# Patient Record
Sex: FEMALE | Race: WHITE | NOT HISPANIC OR LATINO | Employment: OTHER | URBAN - NONMETROPOLITAN AREA
[De-identification: names, ages, dates, MRNs, and addresses within clinical notes are randomized per-mention and may not be internally consistent; named-entity substitution may affect disease eponyms.]

---

## 2023-06-05 PROBLEM — E55.9 VITAMIN D DEFICIENCY: Status: ACTIVE | Noted: 2023-06-05

## 2023-06-05 PROBLEM — F41.9 ANXIETY: Status: ACTIVE | Noted: 2023-06-05

## 2023-06-05 PROBLEM — I50.32 CHRONIC DIASTOLIC CONGESTIVE HEART FAILURE (MULTI): Status: ACTIVE | Noted: 2023-06-05

## 2023-06-05 PROBLEM — I10 BENIGN ESSENTIAL HYPERTENSION: Status: ACTIVE | Noted: 2023-06-05

## 2023-06-05 PROBLEM — K76.0 HEPATIC STEATOSIS: Status: ACTIVE | Noted: 2023-06-05

## 2023-06-05 PROBLEM — E06.3 HASHIMOTO'S THYROIDITIS: Status: ACTIVE | Noted: 2023-06-05

## 2023-06-05 PROBLEM — E11.65 TYPE 2 DIABETES MELLITUS WITH HYPERGLYCEMIA, WITHOUT LONG-TERM CURRENT USE OF INSULIN (MULTI): Status: ACTIVE | Noted: 2023-06-05

## 2023-06-05 PROBLEM — E03.9 HYPOTHYROIDISM: Status: ACTIVE | Noted: 2023-06-05

## 2023-06-05 RX ORDER — SACUBITRIL AND VALSARTAN 24; 26 MG/1; MG/1
TABLET, FILM COATED ORAL 2 TIMES DAILY
COMMUNITY
Start: 2021-03-15

## 2023-06-05 RX ORDER — NAPROXEN 500 MG/1
TABLET ORAL
COMMUNITY
Start: 2020-05-14 | End: 2023-12-27

## 2023-06-05 RX ORDER — LEVOTHYROXINE SODIUM 125 UG/1
1 TABLET ORAL DAILY
COMMUNITY
End: 2023-06-26 | Stop reason: SDUPTHER

## 2023-06-06 LAB
ALANINE AMINOTRANSFERASE (SGPT) (U/L) IN SER/PLAS: 14 U/L (ref 7–45)
ALBUMIN (G/DL) IN SER/PLAS: 3.9 G/DL (ref 3.4–5)
ALKALINE PHOSPHATASE (U/L) IN SER/PLAS: 86 U/L (ref 33–136)
ANION GAP IN SER/PLAS: 11 MMOL/L (ref 10–20)
ASPARTATE AMINOTRANSFERASE (SGOT) (U/L) IN SER/PLAS: 16 U/L (ref 9–39)
BASOPHILS (10*3/UL) IN BLOOD BY AUTOMATED COUNT: 0.06 X10E9/L (ref 0–0.1)
BASOPHILS/100 LEUKOCYTES IN BLOOD BY AUTOMATED COUNT: 1.1 % (ref 0–2)
BILIRUBIN TOTAL (MG/DL) IN SER/PLAS: 1 MG/DL (ref 0–1.2)
CALCIUM (MG/DL) IN SER/PLAS: 9.3 MG/DL (ref 8.6–10.3)
CARBON DIOXIDE, TOTAL (MMOL/L) IN SER/PLAS: 30 MMOL/L (ref 21–32)
CHLORIDE (MMOL/L) IN SER/PLAS: 105 MMOL/L (ref 98–107)
CHOLESTEROL (MG/DL) IN SER/PLAS: 186 MG/DL (ref 0–199)
CHOLESTEROL IN HDL (MG/DL) IN SER/PLAS: 65 MG/DL
CHOLESTEROL/HDL RATIO: 2.9
CREATININE (MG/DL) IN SER/PLAS: 0.86 MG/DL (ref 0.5–1.05)
EOSINOPHILS (10*3/UL) IN BLOOD BY AUTOMATED COUNT: 0.16 X10E9/L (ref 0–0.7)
EOSINOPHILS/100 LEUKOCYTES IN BLOOD BY AUTOMATED COUNT: 2.9 % (ref 0–6)
ERYTHROCYTE DISTRIBUTION WIDTH (RATIO) BY AUTOMATED COUNT: 13.6 % (ref 11.5–14.5)
ERYTHROCYTE MEAN CORPUSCULAR HEMOGLOBIN CONCENTRATION (G/DL) BY AUTOMATED: 31.8 G/DL (ref 32–36)
ERYTHROCYTE MEAN CORPUSCULAR VOLUME (FL) BY AUTOMATED COUNT: 92 FL (ref 80–100)
ERYTHROCYTES (10*6/UL) IN BLOOD BY AUTOMATED COUNT: 4.73 X10E12/L (ref 4–5.2)
GFR FEMALE: 73 ML/MIN/1.73M2
GLUCOSE (MG/DL) IN SER/PLAS: 122 MG/DL (ref 74–99)
HEMATOCRIT (%) IN BLOOD BY AUTOMATED COUNT: 43.4 % (ref 36–46)
HEMOGLOBIN (G/DL) IN BLOOD: 13.8 G/DL (ref 12–16)
IMMATURE GRANULOCYTES/100 LEUKOCYTES IN BLOOD BY AUTOMATED COUNT: 0.2 % (ref 0–0.9)
LDL: 105 MG/DL (ref 0–99)
LEUKOCYTES (10*3/UL) IN BLOOD BY AUTOMATED COUNT: 5.6 X10E9/L (ref 4.4–11.3)
LYMPHOCYTES (10*3/UL) IN BLOOD BY AUTOMATED COUNT: 1.54 X10E9/L (ref 1.2–4.8)
LYMPHOCYTES/100 LEUKOCYTES IN BLOOD BY AUTOMATED COUNT: 27.5 % (ref 13–44)
MONOCYTES (10*3/UL) IN BLOOD BY AUTOMATED COUNT: 0.36 X10E9/L (ref 0.1–1)
MONOCYTES/100 LEUKOCYTES IN BLOOD BY AUTOMATED COUNT: 6.4 % (ref 2–10)
NEUTROPHILS (10*3/UL) IN BLOOD BY AUTOMATED COUNT: 3.48 X10E9/L (ref 1.2–7.7)
NEUTROPHILS/100 LEUKOCYTES IN BLOOD BY AUTOMATED COUNT: 61.9 % (ref 40–80)
PLATELETS (10*3/UL) IN BLOOD AUTOMATED COUNT: 332 X10E9/L (ref 150–450)
POTASSIUM (MMOL/L) IN SER/PLAS: 4.6 MMOL/L (ref 3.5–5.3)
PROTEIN TOTAL: 6.8 G/DL (ref 6.4–8.2)
SODIUM (MMOL/L) IN SER/PLAS: 141 MMOL/L (ref 136–145)
THYROTROPIN (MIU/L) IN SER/PLAS BY DETECTION LIMIT <= 0.05 MIU/L: 0.83 MIU/L (ref 0.44–3.98)
TRIGLYCERIDE (MG/DL) IN SER/PLAS: 82 MG/DL (ref 0–149)
UREA NITROGEN (MG/DL) IN SER/PLAS: 20 MG/DL (ref 6–23)
VLDL: 16 MG/DL (ref 0–40)

## 2023-06-07 ENCOUNTER — OFFICE VISIT (OUTPATIENT)
Dept: PRIMARY CARE | Facility: CLINIC | Age: 68
End: 2023-06-07
Payer: MEDICARE

## 2023-06-07 VITALS
HEART RATE: 58 BPM | WEIGHT: 244.4 LBS | OXYGEN SATURATION: 98 % | SYSTOLIC BLOOD PRESSURE: 124 MMHG | HEIGHT: 67 IN | BODY MASS INDEX: 38.36 KG/M2 | DIASTOLIC BLOOD PRESSURE: 82 MMHG | TEMPERATURE: 97.6 F

## 2023-06-07 DIAGNOSIS — E66.01 CLASS 2 SEVERE OBESITY WITH SERIOUS COMORBIDITY AND BODY MASS INDEX (BMI) OF 38.0 TO 38.9 IN ADULT, UNSPECIFIED OBESITY TYPE (MULTI): ICD-10-CM

## 2023-06-07 DIAGNOSIS — E78.2 MIXED HYPERLIPIDEMIA: ICD-10-CM

## 2023-06-07 DIAGNOSIS — I10 BENIGN ESSENTIAL HYPERTENSION: ICD-10-CM

## 2023-06-07 DIAGNOSIS — Z78.0 MENOPAUSE: ICD-10-CM

## 2023-06-07 DIAGNOSIS — E03.9 ACQUIRED HYPOTHYROIDISM: ICD-10-CM

## 2023-06-07 DIAGNOSIS — E55.9 VITAMIN D DEFICIENCY: ICD-10-CM

## 2023-06-07 DIAGNOSIS — Z00.00 ROUTINE GENERAL MEDICAL EXAMINATION AT HEALTH CARE FACILITY: Primary | ICD-10-CM

## 2023-06-07 DIAGNOSIS — E11.65 TYPE 2 DIABETES MELLITUS WITH HYPERGLYCEMIA, WITHOUT LONG-TERM CURRENT USE OF INSULIN (MULTI): ICD-10-CM

## 2023-06-07 DIAGNOSIS — Z13.31 DEPRESSION SCREENING: ICD-10-CM

## 2023-06-07 DIAGNOSIS — I50.32 CHRONIC DIASTOLIC CONGESTIVE HEART FAILURE (MULTI): ICD-10-CM

## 2023-06-07 LAB
ALBUMIN (MG/L) IN URINE: 8.3 MG/L
ALBUMIN/CREATININE (UG/MG) IN URINE: 5.3 UG/MG CRT (ref 0–30)
CALCIDIOL (25 OH VITAMIN D3) (NG/ML) IN SER/PLAS: 21 NG/ML
CREATININE (MG/DL) IN URINE: 158 MG/DL (ref 20–320)
POC FINGERSTICK BLOOD GLUCOSE: 114 MG/DL (ref 70–100)
POC HEMOGLOBIN A1C: 6 % (ref 4.2–6.5)

## 2023-06-07 PROCEDURE — 3079F DIAST BP 80-89 MM HG: CPT | Performed by: NURSE PRACTITIONER

## 2023-06-07 PROCEDURE — 82962 GLUCOSE BLOOD TEST: CPT | Performed by: NURSE PRACTITIONER

## 2023-06-07 PROCEDURE — 99214 OFFICE O/P EST MOD 30 MIN: CPT | Performed by: NURSE PRACTITIONER

## 2023-06-07 PROCEDURE — 1160F RVW MEDS BY RX/DR IN RCRD: CPT | Performed by: NURSE PRACTITIONER

## 2023-06-07 PROCEDURE — G0447 BEHAVIOR COUNSEL OBESITY 15M: HCPCS | Performed by: NURSE PRACTITIONER

## 2023-06-07 PROCEDURE — 1159F MED LIST DOCD IN RCRD: CPT | Performed by: NURSE PRACTITIONER

## 2023-06-07 PROCEDURE — 83036 HEMOGLOBIN GLYCOSYLATED A1C: CPT | Performed by: NURSE PRACTITIONER

## 2023-06-07 PROCEDURE — G0444 DEPRESSION SCREEN ANNUAL: HCPCS | Performed by: NURSE PRACTITIONER

## 2023-06-07 PROCEDURE — G0439 PPPS, SUBSEQ VISIT: HCPCS | Performed by: NURSE PRACTITIONER

## 2023-06-07 PROCEDURE — 3074F SYST BP LT 130 MM HG: CPT | Performed by: NURSE PRACTITIONER

## 2023-06-07 PROCEDURE — 1170F FXNL STATUS ASSESSED: CPT | Performed by: NURSE PRACTITIONER

## 2023-06-07 PROCEDURE — 1036F TOBACCO NON-USER: CPT | Performed by: NURSE PRACTITIONER

## 2023-06-07 PROCEDURE — 3008F BODY MASS INDEX DOCD: CPT | Performed by: NURSE PRACTITIONER

## 2023-06-07 PROCEDURE — 1123F ACP DISCUSS/DSCN MKR DOCD: CPT | Performed by: NURSE PRACTITIONER

## 2023-06-07 RX ORDER — CHOLECALCIFEROL (VITAMIN D3) 125 MCG
125 CAPSULE ORAL DAILY
Qty: 90 CAPSULE | Refills: 1 | Status: SHIPPED | OUTPATIENT
Start: 2023-06-07 | End: 2024-01-22 | Stop reason: SDUPTHER

## 2023-06-07 ASSESSMENT — ACTIVITIES OF DAILY LIVING (ADL)
BATHING: INDEPENDENT
DRESSING: INDEPENDENT
TAKING_MEDICATION: INDEPENDENT
DOING_HOUSEWORK: INDEPENDENT
MANAGING_FINANCES: INDEPENDENT
GROCERY_SHOPPING: INDEPENDENT

## 2023-06-07 ASSESSMENT — PATIENT HEALTH QUESTIONNAIRE - PHQ9
SUM OF ALL RESPONSES TO PHQ9 QUESTIONS 1 AND 2: 0
1. LITTLE INTEREST OR PLEASURE IN DOING THINGS: NOT AT ALL
2. FEELING DOWN, DEPRESSED OR HOPELESS: NOT AT ALL

## 2023-06-07 ASSESSMENT — ENCOUNTER SYMPTOMS
OCCASIONAL FEELINGS OF UNSTEADINESS: 0
DEPRESSION: 0
LOSS OF SENSATION IN FEET: 0

## 2023-06-07 NOTE — PROGRESS NOTES
Subjective   Reason for Visit: Leona Bourne is an 68 y.o. female here for a Medicare Wellness visit.     Past Medical, Surgical, and Family History reviewed and updated in chart.    Reviewed all medications by prescribing practitioner or clinical pharmacist (such as prescriptions, OTCs, herbal therapies and supplements) and documented in the medical record.    Medicare physical  Chronic diastolic CHF, euvolemic. Follows with Dr. Doyle. Taking Entresto 24/26 mg twice a day. Stress test 3/2021 normal. Echo 11/2022 showed decreased LV systolic function, EF 50%, LVH with diastolic dysfunction, mild-moderate mitral regurg, mild tricuspid regurg, LV segmental wall motion abnormalities. C/o TAI at December visit. Saw Dr. Doyle, had abnormal stress test. Cardiac cath normal coronaries. Her dyspnea resolved after decreasing levothyroxine dose due to low TSH.  Diabetes, A1c today 6.0%. Discussed lifestyle changes including portion control, food choices, and increased activity.  Hypothyroidism, taking levothyroxine 125 mcg. Monitor TSH  Bilateral knee arthritis. Uses naproxen when arthritis flares up.  Obesity with HTN, CHF, DM, BMI 38, Discussed lifestyle changes including portion control, food choices, and increased activity.  Thyroid nodule, no nodules noted last  5/2020  Hyperlipidemia. LDL greater than goal of 75 for diabetes. However she just had a cardiac cath with normal coronaries. She would like to work on lifestyle changes and weight loss prior to starting a statin. We will recheck her lipid in 6 months after lifestyle changes.  The 10-year ASCVD risk score (Thais GARCES, et al., 2019) is: 18.5%    Values used to calculate the score:      Age: 68 years      Sex: Female      Is Non- : No      Diabetic: Yes      Tobacco smoker: No      Systolic Blood Pressure: 124 mmHg      Is BP treated: Yes      HDL Cholesterol: 65 mg/dL      Total Cholesterol: 186 mg/dL;   I spent 15 minutes  obtaining and discussing depression screening using PHQ-2 questions with results documented in the chart.  I spent greater than 15 minutes face-to-face with individual providing recommendations for nutrition choices and exercise plan to help achieve weight reduction.     Preventive:  CRC screen: Colonoscopy 12/2019  Mammogram: 1/2023, negative.  DEXA scan: 11/2021 normal  PAP:    Office Visit on 06/07/2023  POC Fingerstick Blood Glucose                 Date: 06/07/2023  Value: 114 (A)     Ref range: 70 - 100 mg/dl     Status: Final  POC HEMOGLOBIN A1c                            Date: 06/07/2023  Value: 6.0         Ref range: 4.2 - 6.5 %        Status: Final  ------------  Orders Only on 06/06/2023  WBC                                           Date: 06/06/2023  Value: 5.6         Ref range: 4.4 - 11.3 x10E9*  Status: Final  RBC                                           Date: 06/06/2023  Value: 4.73        Ref range: 4.00 - 5.20 x10E*  Status: Final  Hemoglobin                                    Date: 06/06/2023  Value: 13.8        Ref range: 12.0 - 16.0 g/dL   Status: Final  Hematocrit                                    Date: 06/06/2023  Value: 43.4        Ref range: 36.0 - 46.0 %      Status: Final  MCV                                           Date: 06/06/2023  Value: 92          Ref range: 80 - 100 fL        Status: Final  MCHC                                          Date: 06/06/2023  Value: 31.8 (L)    Ref range: 32.0 - 36.0 g/dL   Status: Final  Platelets                                     Date: 06/06/2023  Value: 332         Ref range: 150 - 450 x10E9/L  Status: Final  RDW                                           Date: 06/06/2023  Value: 13.6        Ref range: 11.5 - 14.5 %      Status: Final  Neutrophils %                                 Date: 06/06/2023  Value: 61.9        Ref range: 40.0 - 80.0 %      Status: Final  Immature Granulocytes %, Automated            Date: 06/06/2023  Value: 0.2         Ref  range: 0.0 - 0.9 %        Status: Final                Comment:  Immature Granulocyte Count (IG) includes promyelocytes,    myelocytes and metamyelocytes but does not include bands.   Percent differential counts (%) should be interpreted in the   context of the absolute cell counts (cells/L).    Lymphocytes %                                 Date: 06/06/2023  Value: 27.5        Ref range: 13.0 - 44.0 %      Status: Final  Monocytes %                                   Date: 06/06/2023  Value: 6.4         Ref range: 2.0 - 10.0 %       Status: Final  Eosinophils %                                 Date: 06/06/2023  Value: 2.9         Ref range: 0.0 - 6.0 %        Status: Final  Basophils %                                   Date: 06/06/2023  Value: 1.1         Ref range: 0.0 - 2.0 %        Status: Final  Neutrophils Absolute                          Date: 06/06/2023  Value: 3.48        Ref range: 1.20 - 7.70 x10E*  Status: Final  Lymphocytes Absolute                          Date: 06/06/2023  Value: 1.54        Ref range: 1.20 - 4.80 x10E*  Status: Final  Monocytes Absolute                            Date: 06/06/2023  Value: 0.36        Ref range: 0.10 - 1.00 x10E*  Status: Final  Eosinophils Absolute                          Date: 06/06/2023  Value: 0.16        Ref range: 0.00 - 0.70 x10E*  Status: Final  Basophils Absolute                            Date: 06/06/2023  Value: 0.06        Ref range: 0.00 - 0.10 x10E*  Status: Final  Glucose                                       Date: 06/06/2023  Value: 122 (H)     Ref range: 74 - 99 mg/dL      Status: Final  Sodium                                        Date: 06/06/2023  Value: 141         Ref range: 136 - 145 mmol/L   Status: Final  Potassium                                     Date: 06/06/2023  Value: 4.6         Ref range: 3.5 - 5.3 mmol/L   Status: Final  Chloride                                      Date: 06/06/2023  Value: 105         Ref range: 98 - 107 mmol/L     Status: Final  Bicarbonate                                   Date: 06/06/2023  Value: 30          Ref range: 21 - 32 mmol/L     Status: Final  Anion Gap                                     Date: 06/06/2023  Value: 11          Ref range: 10 - 20 mmol/L     Status: Final  Urea Nitrogen                                 Date: 06/06/2023  Value: 20          Ref range: 6 - 23 mg/dL       Status: Final  Creatinine                                    Date: 06/06/2023  Value: 0.86        Ref range: 0.50 - 1.05 mg/dL  Status: Final  GFR Female                                    Date: 06/06/2023  Value: 73          Ref range: >90 mL/min/1.73m2  Status: Final                Comment:  CALCULATIONS OF ESTIMATED GFR ARE PERFORMED   USING THE 2021 CKD-EPI STUDY REFIT EQUATION   WITHOUT THE RACE VARIABLE FOR THE IDMS-TRACEABLE   CREATININE METHODS.    https://jasn.asnjournals.org/content/early/2021/09/22/ASN.8714259983    Calcium                                       Date: 06/06/2023  Value: 9.3         Ref range: 8.6 - 10.3 mg/dL   Status: Final  Albumin                                       Date: 06/06/2023  Value: 3.9         Ref range: 3.4 - 5.0 g/dL     Status: Final  Alkaline Phosphatase                          Date: 06/06/2023  Value: 86          Ref range: 33 - 136 U/L       Status: Final  Total Protein                                 Date: 06/06/2023  Value: 6.8         Ref range: 6.4 - 8.2 g/dL     Status: Final  AST                                           Date: 06/06/2023  Value: 16          Ref range: 9 - 39 U/L         Status: Final  Total Bilirubin                               Date: 06/06/2023  Value: 1.0         Ref range: 0.0 - 1.2 mg/dL    Status: Final  ALT (SGPT)                                    Date: 06/06/2023  Value: 14          Ref range: 7 - 45 U/L         Status: Final                Comment:  Patients treated with Sulfasalazine may generate    falsely decreased results for ALT.    Vitamin D, 25-Hydroxy                          Date: 06/06/2023  Value: 21 (A)      Ref range: ng/mL              Status: Final                Comment: .  DEFICIENCY:         < 20   NG/ML  INSUFFICIENCY:      20-29  NG/ML  SUFFICIENCY:         NG/ML    THIS ASSAY ACCURATELY QUANTIFIES THE SUM OF  VITAMIN D3, 25-HYDROXY AND VIT D2,25-HYDROXY.    ALBUMIN (MG/L) IN URINE                       Date: 06/06/2023  Value: 8.3         Ref range: Not Established *  Status: Final  Albumin/Creatine Ratio                        Date: 06/06/2023  Value: 5.3         Ref range: 0.0 - 30.0 ug/mg*  Status: Final  Creatinine, Urine                             Date: 06/06/2023  Value: 158.0       Ref range: 20.0 - 320.0 mg/*  Status: Final  TSH                                           Date: 06/06/2023  Value: 0.83        Ref range: 0.44 - 3.98 mIU/L  Status: Final                Comment:  TSH testing is performed using different testing    methodology at [facility] than at other    [facility]. Direct result comparisons should    only be made within the same method.    Cholesterol                                   Date: 06/06/2023  Value: 186         Ref range: 0 - 199 mg/dL      Status: Final                Comment: .      AGE      DESIRABLE   BORDERLINE HIGH   HIGH     0-19 Y     0 - 169       170 - 199     >/= 200    20-24 Y     0 - 189       190 - 224     >/= 225         >24 Y     0 - 199       200 - 239     >/= 240   **All ranges are based on fasting samples. Specific   therapeutic targets will vary based on patient-specific   cardiac risk.  .   Pediatric guidelines reference:Pediatrics 2011, 128(S5).   Adult guidelines reference: NCEP ATPIII Guidelines,     SYLWIA 2001, 258:2486-97  .   Venipuncture immediately after or during the    administration of Metamizole may lead to falsely   low results. Testing should be performed immediately   prior to Metamizole dosing.    HDL                                           Date:  06/06/2023  Value: 65.0        Ref range: mg/dL              Status: Final                Comment: .      AGE      VERY LOW   LOW     NORMAL    HIGH       0-19 Y       < 35   < 40     40-45     ----    20-24 Y       ----   < 40       >45     ----      >24 Y       ----   < 40     40-60      >60  .    Cholesterol/HDL Ratio                         Date: 06/06/2023  Value: 2.9           Status: Final                Comment: REF VALUES  DESIRABLE  < 3.4  HIGH RISK  > 5.0    LDL                                           Date: 06/06/2023  Value: 105 (H)     Ref range: 0 - 99 mg/dL       Status: Final                Comment: .                           NEAR      BORD      AGE      DESIRABLE  OPTIMAL    HIGH     HIGH     VERY HIGH     0-19 Y     0 - 109     ---    110-129   >/= 130     ----    20-24 Y     0 - 119     ---    120-159   >/= 160     ----      >24 Y     0 -  99   100-129  130-159   160-189     >/=190  .    VLDL                                          Date: 06/06/2023  Value: 16          Ref range: 0 - 40 mg/dL       Status: Final  Triglycerides                                 Date: 06/06/2023  Value: 82          Ref range: 0 - 149 mg/dL      Status: Final                Comment: .      AGE      DESIRABLE   BORDERLINE HIGH   HIGH     VERY HIGH   0 D-90 D    19 - 174         ----         ----        ----  91 D- 9 Y     0 -  74        75 -  99     >/= 100      ----    10-19 Y     0 -  89        90 - 129     >/= 130      ----    20-24 Y     0 - 114       115 - 149     >/= 150      ----         >24 Y     0 - 149       150 - 199    200- 499    >/= 500  .   Venipuncture immediately after or during the    administration of Metamizole may lead to falsely   low results. Testing should be performed immediately   prior to Metamizole dosing.        Patient Care Team:  DEWAYNE Morrison DNP as PCP - General  DEWAYNE Morrison DNP as PCP - [facility] Attributed Provider     Review of Systems  "  Constitutional:  Negative for activity change, chills, fatigue and fever.   HENT:  Negative for congestion, rhinorrhea, sinus pressure, sinus pain and sore throat.    Eyes: Negative.    Respiratory:  Negative for cough, chest tightness, shortness of breath and wheezing.    Cardiovascular:  Negative for chest pain, palpitations and leg swelling.   Gastrointestinal:  Negative for abdominal distention, abdominal pain, constipation, diarrhea, nausea and vomiting.   Endocrine: Negative.    Genitourinary: Negative.    Musculoskeletal:  Positive for arthralgias.   Skin: Negative.    Allergic/Immunologic: Negative.    Neurological:  Negative for dizziness, syncope, weakness, light-headedness, numbness and headaches.   Hematological: Negative.    Psychiatric/Behavioral: Negative.     All other systems reviewed and are negative.      Objective   Vitals:  /82   Pulse 58   Temp 36.4 °C (97.6 °F)   Ht 1.702 m (5' 7\")   Wt 111 kg (244 lb 6.4 oz)   SpO2 98%   BMI 38.28 kg/m²       Physical Exam  Vitals and nursing note reviewed.   Constitutional:       General: She is not in acute distress.     Appearance: Normal appearance. She is obese. She is not ill-appearing.   HENT:      Head: Normocephalic and atraumatic.      Right Ear: Tympanic membrane, ear canal and external ear normal.      Left Ear: Tympanic membrane, ear canal and external ear normal.      Nose: Nose normal.      Mouth/Throat:      Mouth: Mucous membranes are moist.      Pharynx: Oropharynx is clear.   Eyes:      Pupils: Pupils are equal, round, and reactive to light.   Cardiovascular:      Rate and Rhythm: Normal rate and regular rhythm.      Pulses: Normal pulses.      Heart sounds: Normal heart sounds. No murmur heard.  Pulmonary:      Effort: Pulmonary effort is normal. No respiratory distress.      Breath sounds: Normal breath sounds. No wheezing.   Abdominal:      General: Bowel sounds are normal. There is no distension.      Palpations: Abdomen " is soft.      Tenderness: There is no abdominal tenderness.   Musculoskeletal:         General: No tenderness. Normal range of motion.      Cervical back: Normal range of motion and neck supple.      Right lower leg: No edema.      Left lower leg: No edema.   Skin:     General: Skin is warm and dry.      Capillary Refill: Capillary refill takes less than 2 seconds.      Coloration: Skin is not jaundiced.   Neurological:      General: No focal deficit present.      Mental Status: She is alert and oriented to person, place, and time.      Motor: No weakness.   Psychiatric:         Mood and Affect: Mood normal.         Behavior: Behavior normal.         Thought Content: Thought content normal.         Judgment: Judgment normal.         Assessment/Plan        # Diabetes, controlled, A1c 6.0%  -Continue lifestyle changes: Including weight loss, increased activity, diet changes  -Monitor glucose and A1c  # Chronic CHF, euvolemic, compensated  -Following with cardiology  -Taking Entresto 24/26 mg twice a day  -weight loss  -2 GM sodium diet  -Weigh yourself every morning before breakfast  -Call the office if you have a weight gain of 3 or more pounds in one day or 5 or more pounds in one week  # Hypothyroidism  -continue Levothyroxine 150 mcg daily  -monitor TSH, check today  # Morbid obesity, BMI 38  -Avoid sweets and sugary drinks  -Drink plenty of water  -Avoid processed foods and refined foods  -Eat fruits, vegetables, lean protein, whole grains  -Increase your activity level  # Bilateral knee arthritis  -Tylenol 650 mg every 8 hours as needed for pain or Naproxen as needed     Follow-up in 6 months and as needed

## 2023-06-10 ASSESSMENT — ENCOUNTER SYMPTOMS
ABDOMINAL DISTENTION: 0
ARTHRALGIAS: 1
NUMBNESS: 0
LIGHT-HEADEDNESS: 0
HEADACHES: 0
FATIGUE: 0
ABDOMINAL PAIN: 0
SINUS PAIN: 0
DIZZINESS: 0
NAUSEA: 0
CHEST TIGHTNESS: 0
SHORTNESS OF BREATH: 0
VOMITING: 0
FEVER: 0
SINUS PRESSURE: 0
COUGH: 0
CONSTIPATION: 0
HEMATOLOGIC/LYMPHATIC NEGATIVE: 1
SORE THROAT: 0
DIARRHEA: 0
PALPITATIONS: 0
WEAKNESS: 0
ENDOCRINE NEGATIVE: 1
PSYCHIATRIC NEGATIVE: 1
RHINORRHEA: 0
CHILLS: 0
ALLERGIC/IMMUNOLOGIC NEGATIVE: 1
WHEEZING: 0
ACTIVITY CHANGE: 0
EYES NEGATIVE: 1

## 2023-06-26 DIAGNOSIS — E03.9 ACQUIRED HYPOTHYROIDISM: ICD-10-CM

## 2023-06-26 RX ORDER — LEVOTHYROXINE SODIUM 125 UG/1
125 TABLET ORAL DAILY
Qty: 90 TABLET | Refills: 0 | Status: SHIPPED | OUTPATIENT
Start: 2023-06-26 | End: 2023-09-27

## 2023-09-27 DIAGNOSIS — E03.9 ACQUIRED HYPOTHYROIDISM: ICD-10-CM

## 2023-09-27 RX ORDER — LEVOTHYROXINE SODIUM 125 UG/1
125 TABLET ORAL DAILY
Qty: 90 TABLET | Refills: 0 | Status: SHIPPED | OUTPATIENT
Start: 2023-09-27 | End: 2024-01-02

## 2023-12-07 ENCOUNTER — APPOINTMENT (OUTPATIENT)
Dept: PRIMARY CARE | Facility: CLINIC | Age: 68
End: 2023-12-07
Payer: MEDICARE

## 2023-12-27 DIAGNOSIS — M17.0 PRIMARY OSTEOARTHRITIS OF BOTH KNEES: Primary | ICD-10-CM

## 2023-12-27 RX ORDER — NAPROXEN 500 MG/1
TABLET ORAL
Qty: 60 TABLET | Refills: 0 | Status: SHIPPED | OUTPATIENT
Start: 2023-12-27 | End: 2024-06-05 | Stop reason: ALTCHOICE

## 2024-01-01 DIAGNOSIS — E03.9 ACQUIRED HYPOTHYROIDISM: ICD-10-CM

## 2024-01-02 RX ORDER — LEVOTHYROXINE SODIUM 125 UG/1
125 TABLET ORAL DAILY
Qty: 90 TABLET | Refills: 0 | Status: SHIPPED | OUTPATIENT
Start: 2024-01-02 | End: 2024-01-22 | Stop reason: SDUPTHER

## 2024-01-10 ENCOUNTER — APPOINTMENT (OUTPATIENT)
Dept: PRIMARY CARE | Facility: CLINIC | Age: 69
End: 2024-01-10
Payer: MEDICARE

## 2024-01-22 ENCOUNTER — OFFICE VISIT (OUTPATIENT)
Dept: PRIMARY CARE | Facility: CLINIC | Age: 69
End: 2024-01-22
Payer: MEDICARE

## 2024-01-22 ENCOUNTER — LAB (OUTPATIENT)
Dept: LAB | Facility: LAB | Age: 69
End: 2024-01-22
Payer: MEDICARE

## 2024-01-22 VITALS
TEMPERATURE: 96.8 F | OXYGEN SATURATION: 98 % | DIASTOLIC BLOOD PRESSURE: 82 MMHG | SYSTOLIC BLOOD PRESSURE: 136 MMHG | WEIGHT: 255.7 LBS | BODY MASS INDEX: 40.05 KG/M2 | HEART RATE: 73 BPM

## 2024-01-22 DIAGNOSIS — E66.01 CLASS 3 SEVERE OBESITY WITH SERIOUS COMORBIDITY AND BODY MASS INDEX (BMI) OF 40.0 TO 44.9 IN ADULT, UNSPECIFIED OBESITY TYPE (MULTI): ICD-10-CM

## 2024-01-22 DIAGNOSIS — E11.65 TYPE 2 DIABETES MELLITUS WITH HYPERGLYCEMIA, WITHOUT LONG-TERM CURRENT USE OF INSULIN (MULTI): Primary | ICD-10-CM

## 2024-01-22 DIAGNOSIS — I10 BENIGN ESSENTIAL HYPERTENSION: ICD-10-CM

## 2024-01-22 DIAGNOSIS — E78.2 MIXED HYPERLIPIDEMIA: ICD-10-CM

## 2024-01-22 DIAGNOSIS — Z12.31 ENCOUNTER FOR SCREENING MAMMOGRAM FOR MALIGNANT NEOPLASM OF BREAST: ICD-10-CM

## 2024-01-22 DIAGNOSIS — E55.9 VITAMIN D DEFICIENCY: ICD-10-CM

## 2024-01-22 DIAGNOSIS — E03.9 ACQUIRED HYPOTHYROIDISM: ICD-10-CM

## 2024-01-22 DIAGNOSIS — I50.32 CHRONIC DIASTOLIC CONGESTIVE HEART FAILURE (MULTI): ICD-10-CM

## 2024-01-22 LAB
POC FINGERSTICK BLOOD GLUCOSE: 119 MG/DL (ref 70–100)
POC HEMOGLOBIN A1C: 6.5 % (ref 4.2–6.5)
T4 FREE SERPL-MCNC: 0.74 NG/DL (ref 0.61–1.12)
TSH SERPL-ACNC: 11.87 MIU/L (ref 0.44–3.98)

## 2024-01-22 PROCEDURE — 1157F ADVNC CARE PLAN IN RCRD: CPT | Performed by: NURSE PRACTITIONER

## 2024-01-22 PROCEDURE — 36415 COLL VENOUS BLD VENIPUNCTURE: CPT

## 2024-01-22 PROCEDURE — 3075F SYST BP GE 130 - 139MM HG: CPT | Performed by: NURSE PRACTITIONER

## 2024-01-22 PROCEDURE — 1160F RVW MEDS BY RX/DR IN RCRD: CPT | Performed by: NURSE PRACTITIONER

## 2024-01-22 PROCEDURE — 1159F MED LIST DOCD IN RCRD: CPT | Performed by: NURSE PRACTITIONER

## 2024-01-22 PROCEDURE — 3079F DIAST BP 80-89 MM HG: CPT | Performed by: NURSE PRACTITIONER

## 2024-01-22 PROCEDURE — 1036F TOBACCO NON-USER: CPT | Performed by: NURSE PRACTITIONER

## 2024-01-22 PROCEDURE — 3008F BODY MASS INDEX DOCD: CPT | Performed by: NURSE PRACTITIONER

## 2024-01-22 PROCEDURE — 83036 HEMOGLOBIN GLYCOSYLATED A1C: CPT | Performed by: NURSE PRACTITIONER

## 2024-01-22 PROCEDURE — 82962 GLUCOSE BLOOD TEST: CPT | Performed by: NURSE PRACTITIONER

## 2024-01-22 PROCEDURE — 99214 OFFICE O/P EST MOD 30 MIN: CPT | Performed by: NURSE PRACTITIONER

## 2024-01-22 RX ORDER — CHOLECALCIFEROL (VITAMIN D3) 125 MCG
125 CAPSULE ORAL DAILY
Qty: 90 CAPSULE | Refills: 1 | Status: SHIPPED | OUTPATIENT
Start: 2024-01-22 | End: 2024-06-05 | Stop reason: ALTCHOICE

## 2024-01-22 RX ORDER — LEVOTHYROXINE SODIUM 150 UG/1
150 TABLET ORAL DAILY
Qty: 90 TABLET | Refills: 0 | Status: SHIPPED | OUTPATIENT
Start: 2024-01-22 | End: 2024-04-10 | Stop reason: SDUPTHER

## 2024-01-24 ASSESSMENT — ENCOUNTER SYMPTOMS
CHEST TIGHTNESS: 0
SHORTNESS OF BREATH: 0
WEAKNESS: 0
CHILLS: 0
COUGH: 0
PSYCHIATRIC NEGATIVE: 1
FEVER: 0
SORE THROAT: 0
NUMBNESS: 0
CONSTIPATION: 0
NAUSEA: 0
HEMATOLOGIC/LYMPHATIC NEGATIVE: 1
ALLERGIC/IMMUNOLOGIC NEGATIVE: 1
RHINORRHEA: 0
EYES NEGATIVE: 1
SINUS PRESSURE: 0
DIZZINESS: 0
HEADACHES: 0
ABDOMINAL PAIN: 0
PALPITATIONS: 0
WHEEZING: 0
ABDOMINAL DISTENTION: 0
DIARRHEA: 0
ARTHRALGIAS: 1
SINUS PAIN: 0
VOMITING: 0
ENDOCRINE NEGATIVE: 1
ACTIVITY CHANGE: 0
FATIGUE: 0
LIGHT-HEADEDNESS: 0

## 2024-01-24 NOTE — PROGRESS NOTES
Subjective   Patient ID: Leona Bourne is a 68 y.o. female who presents for Diabetes and Hypothyroidism.    Chronic diastolic CHF, euvolemic. Follows with Dr. Doyle. Taking Entresto 24/26 mg twice a day. Stress test 12/2022 no ischemia. Echo 11/2022 showed decreased LV systolic function, EF 50%, LVH with diastolic dysfunction, mild-moderate mitral regurg, mild tricuspid regurg, LV segmental wall motion abnormalities. Cardiac cath normal coronaries.   Diabetes, A1c today 6.5%. Discussed lifestyle changes including portion control, food choices, and increased activity.  Her A1c has gone up a little bit.  Discussed medications that also have cardiac benefits such as Jardiance, Ozempic.  She is going to discuss with Dr. Doyle at her next visit with him.  Hypothyroidism, taking levothyroxine 125 mcg. Check TSH today.  Bilateral knee arthritis. Uses naproxen when arthritis flares up. Follows with Dr. Puente as needed.  Obesity with HTN, CHF, DM, BMI 40, Discussed lifestyle changes including portion control, food choices, and increased activity.  Thyroid nodule, no nodules noted last US 5/2020  Hyperlipidemia. LDL greater than goal of 75 for diabetes. However she just had a cardiac cath with normal coronaries. She would like to work on lifestyle changes and weight loss prior to starting a statin. Due for lipid panel before next visit.  The 10-year ASCVD risk score (Thais GARCES, et al., 2019) is: 21.9%    Values used to calculate the score:      Age: 68 years      Sex: Female      Is Non- : No      Diabetic: Yes      Tobacco smoker: No      Systolic Blood Pressure: 136 mmHg      Is BP treated: Yes      HDL Cholesterol: 65 mg/dL      Total Cholesterol: 186 mg/dL  Due for mammogram and bone density  Due for complete labs before next visit     Preventive:  CRC screen: Colonoscopy 12/2019  Mammogram: 1/2023, negative  DEXA scan: 11/2021 normal  PAP:  Stress test: 12/2022  Cardiac cath:  12/2022    Office Visit on 01/22/2024  POC Fingerstick Blood Glucose                 Date: 01/22/2024  Value: 119 (A)     Ref range: 70 - 100 mg/dl     Status: Final  POC HEMOGLOBIN A1c                            Date: 01/22/2024  Value: 6.5         Ref range: 4.2 - 6.5 %        Status: Final      Review of Systems   Constitutional:  Negative for activity change, chills, fatigue and fever.   HENT:  Negative for congestion, rhinorrhea, sinus pressure, sinus pain and sore throat.    Eyes: Negative.    Respiratory:  Negative for cough, chest tightness, shortness of breath and wheezing.    Cardiovascular:  Negative for chest pain, palpitations and leg swelling.   Gastrointestinal:  Negative for abdominal distention, abdominal pain, constipation, diarrhea, nausea and vomiting.   Endocrine: Negative.    Genitourinary: Negative.    Musculoskeletal:  Positive for arthralgias.   Skin: Negative.    Allergic/Immunologic: Negative.    Neurological:  Negative for dizziness, syncope, weakness, light-headedness, numbness and headaches.   Hematological: Negative.    Psychiatric/Behavioral: Negative.     All other systems reviewed and are negative.      Objective   /82   Pulse 73   Temp 36 °C (96.8 °F)   Wt 116 kg (255 lb 11.2 oz)   SpO2 98%   BMI 40.05 kg/m²     Physical Exam  Vitals and nursing note reviewed.   Constitutional:       General: She is not in acute distress.     Appearance: Normal appearance. She is obese. She is not ill-appearing.   HENT:      Head: Normocephalic and atraumatic.      Right Ear: Tympanic membrane, ear canal and external ear normal.      Left Ear: Tympanic membrane, ear canal and external ear normal.      Nose: Nose normal.      Mouth/Throat:      Mouth: Mucous membranes are moist.      Pharynx: Oropharynx is clear.   Eyes:      Pupils: Pupils are equal, round, and reactive to light.   Cardiovascular:      Rate and Rhythm: Normal rate and regular rhythm.      Pulses: Normal pulses.       Heart sounds: Normal heart sounds. No murmur heard.  Pulmonary:      Effort: Pulmonary effort is normal. No respiratory distress.      Breath sounds: Normal breath sounds. No wheezing.   Abdominal:      General: Bowel sounds are normal. There is no distension.      Palpations: Abdomen is soft.      Tenderness: There is no abdominal tenderness.   Musculoskeletal:         General: No tenderness. Normal range of motion.      Cervical back: Normal range of motion and neck supple.      Right lower leg: No edema.      Left lower leg: No edema.   Skin:     General: Skin is warm and dry.      Capillary Refill: Capillary refill takes less than 2 seconds.      Coloration: Skin is not jaundiced.   Neurological:      General: No focal deficit present.      Mental Status: She is alert and oriented to person, place, and time.      Motor: No weakness.   Psychiatric:         Mood and Affect: Mood normal.         Behavior: Behavior normal.         Thought Content: Thought content normal.         Judgment: Judgment normal.       Assessment/Plan     # Diabetes, controlled, A1c 6.5%  -Continue lifestyle changes: Including weight loss, increased activity, diet changes  -Monitor glucose and A1c  -Discuss Jardiance or Ozempic with Dr. Doyle  # Chronic CHF, euvolemic, compensated  -Following with cardiology  -Taking Entresto 24/26 mg twice a day  -weight loss  -2 GM sodium diet  -Weigh yourself every morning before breakfast  -Call the office if you have a weight gain of 3 or more pounds in one day or 5 or more pounds in one week  # Hypothyroidism  -Taking Levothyroxine 125 mcg daily  -monitor TSH, check today, adjust levothyroxine accordingly  # Morbid obesity, BMI 40  -Avoid sweets and sugary drinks  -Drink plenty of water  -Avoid processed foods and refined foods  -Eat fruits, vegetables, lean protein, whole grains  -Increase your activity level  # Bilateral knee arthritis  -Tylenol 650 mg every 8 hours as needed for pain or Naproxen  as needed     Due for mammogram and DEXA  Due for CBC, CMP, lipid, TSH before next visit  Follow-up in 6 months for Medicare Physical and as needed

## 2024-04-10 ENCOUNTER — PATIENT MESSAGE (OUTPATIENT)
Dept: PRIMARY CARE | Facility: CLINIC | Age: 69
End: 2024-04-10
Payer: MEDICARE

## 2024-04-10 DIAGNOSIS — E03.9 ACQUIRED HYPOTHYROIDISM: ICD-10-CM

## 2024-04-10 DIAGNOSIS — E11.65 TYPE 2 DIABETES MELLITUS WITH HYPERGLYCEMIA, WITHOUT LONG-TERM CURRENT USE OF INSULIN (MULTI): Primary | ICD-10-CM

## 2024-04-10 RX ORDER — LEVOTHYROXINE SODIUM 150 UG/1
150 TABLET ORAL DAILY
Qty: 90 TABLET | Refills: 0 | Status: SHIPPED | OUTPATIENT
Start: 2024-04-10

## 2024-05-02 ENCOUNTER — HOSPITAL ENCOUNTER (OUTPATIENT)
Dept: RADIOLOGY | Facility: HOSPITAL | Age: 69
Discharge: HOME | End: 2024-05-02
Payer: MEDICARE

## 2024-05-02 VITALS — HEIGHT: 68 IN | BODY MASS INDEX: 36.37 KG/M2 | WEIGHT: 240 LBS

## 2024-05-02 DIAGNOSIS — Z12.31 ENCOUNTER FOR SCREENING MAMMOGRAM FOR MALIGNANT NEOPLASM OF BREAST: ICD-10-CM

## 2024-05-02 PROCEDURE — 77067 SCR MAMMO BI INCL CAD: CPT

## 2024-05-02 PROCEDURE — 77067 SCR MAMMO BI INCL CAD: CPT | Performed by: RADIOLOGY

## 2024-05-02 PROCEDURE — 77063 BREAST TOMOSYNTHESIS BI: CPT | Performed by: RADIOLOGY

## 2024-05-14 ENCOUNTER — TELEPHONE (OUTPATIENT)
Dept: PRIMARY CARE | Facility: CLINIC | Age: 69
End: 2024-05-14
Payer: MEDICARE

## 2024-05-14 DIAGNOSIS — N39.0 URINARY TRACT INFECTION WITHOUT HEMATURIA, SITE UNSPECIFIED: ICD-10-CM

## 2024-05-14 RX ORDER — NITROFURANTOIN 25; 75 MG/1; MG/1
100 CAPSULE ORAL 2 TIMES DAILY
Qty: 10 CAPSULE | Refills: 0 | Status: SHIPPED | OUTPATIENT
Start: 2024-05-14 | End: 2024-06-05 | Stop reason: ALTCHOICE

## 2024-05-14 RX ORDER — NITROFURANTOIN 25; 75 MG/1; MG/1
100 CAPSULE ORAL 2 TIMES DAILY
COMMUNITY
End: 2024-05-14 | Stop reason: SDUPTHER

## 2024-05-14 NOTE — TELEPHONE ENCOUNTER
Blood in urine, uti. Symptoms started about 1 week ago. No medication allergies. Patient started ozempec about 1 month ago. Not sure if that has anything to do with it. Jessy Serrano.

## 2024-05-24 ENCOUNTER — APPOINTMENT (OUTPATIENT)
Dept: PRIMARY CARE | Facility: CLINIC | Age: 69
End: 2024-05-24
Payer: MEDICARE

## 2024-06-04 ASSESSMENT — ENCOUNTER SYMPTOMS
SINUS PRESSURE: 0
WHEEZING: 0
ALLERGIC/IMMUNOLOGIC NEGATIVE: 1
PSYCHIATRIC NEGATIVE: 1
DIARRHEA: 0
SHORTNESS OF BREATH: 0
SINUS PAIN: 0
ACTIVITY CHANGE: 0
COUGH: 0
EYES NEGATIVE: 1
ABDOMINAL DISTENTION: 0
FEVER: 0
RHINORRHEA: 0
NAUSEA: 0
FATIGUE: 0
CHILLS: 0
ENDOCRINE NEGATIVE: 1
NUMBNESS: 0
ARTHRALGIAS: 1
PALPITATIONS: 0
CONSTIPATION: 0
DIZZINESS: 0
SORE THROAT: 0
WEAKNESS: 0
HEADACHES: 0
LIGHT-HEADEDNESS: 0
VOMITING: 0
HEMATOLOGIC/LYMPHATIC NEGATIVE: 1
ABDOMINAL PAIN: 0
CHEST TIGHTNESS: 0

## 2024-06-04 NOTE — PROGRESS NOTES
Subjective   Patient ID: Leona Bourne is a 69 y.o. female who presents for Blood in Urine (On 5/14, Dr. Gaspar wanted a follow up after having nitrofurantoin, has not seen any blood in urine since but did bring urine specimen in for testing.  Had just started ozempic when this hematuria started.).    UTI follow-up.  Patient treated for UTI 5/14 with Macrobid x 5 days.  No longer having hematuria but continues to have some burning.  Repeat UA today positive leukocytes, WBCs.  Start Macrobid 100 mg twice a day for 5 days.  Educated to stay well-hydrated.  Diabetes, controlled.  Taking Ozempic 0.5 mg weekly.  22 pound weight loss since January.  Chronic diastolic CHF, euvolemic. Follows with Dr. Doyle. Taking Entresto 24/26 mg twice a day. Stress test 12/2022 no ischemia. Echo 11/2022 showed decreased LV systolic function, EF 50%, LVH with diastolic dysfunction, mild-moderate mitral regurg, mild tricuspid regurg, LV segmental wall motion abnormalities. Cardiac cath normal coronaries.   Hypothyroidism, taking levothyroxine 150 mcg.  Monitor TSH  Bilateral knee arthritis. Uses naproxen when arthritis flares up. Follows with Dr. Puente as needed.  Obesity with HTN, CHF, DM, BMI 35, Discussed lifestyle changes including portion control, food choices, and increased activity.  Thyroid nodule, no nodules noted last US 5/2020  Hyperlipidemia. LDL greater than goal of 75 for diabetes. However she just had a cardiac cath with normal coronaries.  Recommend low fat/low cholesterol diet, eat more fresh foods, avoid processed/prepackaged/fast foods, increase physical activity, weight loss. Recommend Mediterranean diet.  Due for bone density  Due for complete labs before next visit     Preventive:  CRC screen: Colonoscopy 12/2019  Mammogram: 5/2024, negative  DEXA scan: 11/2021 normal  PAP:  Stress test: 12/2022  Cardiac cath: 12/2022        The 10-year ASCVD risk score (Thais GARCES, et al., 2019) is: 21.9%    Values used to  calculate the score:      Age: 69 years      Sex: Female      Is Non- : No      Diabetic: Yes      Tobacco smoker: No      Systolic Blood Pressure: 128 mmHg      Is BP treated: Yes      HDL Cholesterol: 65 mg/dL      Total Cholesterol: 186 mg/dL    Lab on 06/05/2024   Component Date Value Ref Range Status    Color, Urine 06/05/2024 Yellow  Light-Yellow, Yellow, Dark-Yellow Final    Appearance, Urine 06/05/2024 Turbid (N)  Clear Final    Specific Gravity, Urine 06/05/2024 1.025  1.005 - 1.035 Final    pH, Urine 06/05/2024 5.5  5.0, 5.5, 6.0, 6.5, 7.0, 7.5, 8.0 Final    Protein, Urine 06/05/2024 NEGATIVE  NEGATIVE, 10 (TRACE), 20 (TRACE) mg/dL Final    Glucose, Urine 06/05/2024 Normal  Normal mg/dL Final    Blood, Urine 06/05/2024 NEGATIVE  NEGATIVE Final    Ketones, Urine 06/05/2024 NEGATIVE  NEGATIVE mg/dL Final    Bilirubin, Urine 06/05/2024 NEGATIVE  NEGATIVE Final    Urobilinogen, Urine 06/05/2024 Normal  Normal mg/dL Final    Nitrite, Urine 06/05/2024 NEGATIVE  NEGATIVE Final    Leukocyte Esterase, Urine 06/05/2024 500 Surendra/µL (A)  NEGATIVE Final    WBC, Urine 06/05/2024 >50 (A)  1-5, NONE /HPF Final    RBC, Urine 06/05/2024 6-10 (A)  NONE, 1-2, 3-5 /HPF Final    Squamous Epithelial Cells, Urine 06/05/2024 1-9 (SPARSE)  Reference range not established. /HPF Final    Mucus, Urine 06/05/2024 FEW  Reference range not established. /LPF Final       BI mammo bilateral screening tomosynthesis  Narrative: Interpreted By:  Cas Schmitt,   STUDY:  BI MAMMO BILATERAL SCREENING TOMOSYNTHESIS;  5/2/2024 10:24 am      ACCESSION NUMBER(S):  [id number]      ORDERING CLINICIAN:  PABLITO LEONARD      INDICATION:  Screening. History of a benign left breast biopsy decades ago and a  maternal aunt with breast cancer.      COMPARISON:  01/09/2023 and 07/27/2021 bilateral screening mammograms with  tomosynthesis.      FINDINGS:  2D and tomosynthesis images were reviewed at 1 mm slice thickness.       Density:  The breast tissue is heterogeneously dense, which may  obscure small masses.      No suspicious masses or calcifications are identified.      Impression: No mammographic evidence of malignancy.      BI-RADS CATEGORY:      BI-RADS Category:  1 Negative.  Recommendation:  Annual Screening.  Recommended Date:  1 Year.  Laterality:  Bilateral.      For any future breast imaging appointments, please call [phone]  [phone]).              Based on the Tyrer-Cuzick model for breast cancer risk assessment,  the patient's lifetime risk of breast cancer is 6.9%. Patients with  over a 20% lifetime risk of developing breast cancer may benefit from  additional screening with breast MRI or ultrasound. Please note that  this estimate is based on responses provided on the patient  questionnaire. For more information regarding high risk consultation,  please call [phone].      MACRO:  None      Signed by: Cas Schmitt 5/2/2024 5:41 PM  Dictation workstation:   [id number]       Review of Systems   Constitutional:  Negative for activity change, chills, fatigue and fever.   HENT:  Negative for congestion, rhinorrhea, sinus pressure, sinus pain and sore throat.    Eyes: Negative.    Respiratory:  Negative for cough, chest tightness, shortness of breath and wheezing.    Cardiovascular:  Negative for chest pain, palpitations and leg swelling.   Gastrointestinal:  Negative for abdominal distention, abdominal pain, constipation, diarrhea, nausea and vomiting.   Endocrine: Negative.    Genitourinary:  Positive for dysuria.   Musculoskeletal:  Positive for arthralgias.   Skin: Negative.    Allergic/Immunologic: Negative.    Neurological:  Negative for dizziness, syncope, weakness, light-headedness, numbness and headaches.   Hematological: Negative.    Psychiatric/Behavioral: Negative.     All other systems reviewed and are negative.      Objective   Visit Vitals  /82   Pulse 69   Temp 35.8 °C (96.5 °F)   Wt 106  kg (233 lb 4.8 oz)   SpO2 98%   BMI 35.47 kg/m²   OB Status Postmenopausal   Smoking Status Never   BSA 2.26 m²      Physical Exam  Vitals and nursing note reviewed.   Constitutional:       General: She is not in acute distress.     Appearance: Normal appearance. She is obese. She is not ill-appearing.   HENT:      Head: Normocephalic and atraumatic.      Right Ear: Tympanic membrane, ear canal and external ear normal.      Left Ear: Tympanic membrane, ear canal and external ear normal.      Nose: Nose normal.      Mouth/Throat:      Mouth: Mucous membranes are moist.      Pharynx: Oropharynx is clear.   Eyes:      Pupils: Pupils are equal, round, and reactive to light.   Cardiovascular:      Rate and Rhythm: Normal rate and regular rhythm.      Pulses: Normal pulses.      Heart sounds: Normal heart sounds. No murmur heard.  Pulmonary:      Effort: Pulmonary effort is normal. No respiratory distress.      Breath sounds: Normal breath sounds. No wheezing.   Abdominal:      General: Bowel sounds are normal. There is no distension.      Palpations: Abdomen is soft.      Tenderness: There is no abdominal tenderness.   Musculoskeletal:         General: No tenderness. Normal range of motion.      Cervical back: Normal range of motion and neck supple.      Right lower leg: No edema.      Left lower leg: No edema.   Skin:     General: Skin is warm and dry.      Capillary Refill: Capillary refill takes less than 2 seconds.      Coloration: Skin is not jaundiced.   Neurological:      General: No focal deficit present.      Mental Status: She is alert and oriented to person, place, and time.      Motor: No weakness.   Psychiatric:         Mood and Affect: Mood normal.         Behavior: Behavior normal.         Thought Content: Thought content normal.         Judgment: Judgment normal.         Assessment/Plan   Leona was seen today for blood in urine.  Diagnoses and all orders for this visit:  Hematuria, unspecified type  -      Urinalysis with Reflex Culture and Microscopic; Future     # Diabetes, controlled, A1c 6.5%  -Continue Ozempic  -Low fat/cholesterol, low sodium, carbohydrate controlled diet  -Exercise as tolerated 150 minutes/week, gradually increase activity  -Weight loss  -Regular follow-up with ophthalmology and podiatry  # Chronic CHF, euvolemic, compensated  -Following with cardiology  -Taking Entresto 24/26 mg twice a day  -weight loss  -2 GM sodium diet  -Weigh yourself every morning before breakfast  -Call the office if you have a weight gain of 3 or more pounds in one day or 5 or more pounds in one week  # Hypothyroidism  -Taking Levothyroxine 150 mcg daily  -monitor TSH, check today, adjust levothyroxine accordingly  # Morbid obesity, BMI 40  -Avoid sweets and sugary drinks  -Drink plenty of water  -Avoid processed foods and refined foods  -Eat fruits, vegetables, lean protein, whole grains  -Increase your activity level  # Bilateral knee arthritis  -Tylenol 650 mg every 8 hours as needed for pain or Naproxen as needed  # Obesity, BMI 35  -Continue Ozempic  -Avoid sweets and sugary drinks  -Drink plenty of water  -Avoid processed foods and refined foods  -Eat fruits, vegetables, lean protein, whole grains  -Increase your activity level    Follow-up as scheduled in July and as needed

## 2024-06-05 ENCOUNTER — OFFICE VISIT (OUTPATIENT)
Dept: PRIMARY CARE | Facility: CLINIC | Age: 69
End: 2024-06-05
Payer: MEDICARE

## 2024-06-05 ENCOUNTER — LAB (OUTPATIENT)
Dept: LAB | Facility: LAB | Age: 69
End: 2024-06-05
Payer: MEDICARE

## 2024-06-05 VITALS
SYSTOLIC BLOOD PRESSURE: 128 MMHG | WEIGHT: 233.3 LBS | BODY MASS INDEX: 35.47 KG/M2 | HEART RATE: 69 BPM | TEMPERATURE: 96.5 F | DIASTOLIC BLOOD PRESSURE: 82 MMHG | OXYGEN SATURATION: 98 %

## 2024-06-05 DIAGNOSIS — N30.00 ACUTE CYSTITIS WITHOUT HEMATURIA: Primary | ICD-10-CM

## 2024-06-05 DIAGNOSIS — N30.01 ACUTE CYSTITIS WITH HEMATURIA: Primary | ICD-10-CM

## 2024-06-05 DIAGNOSIS — I10 BENIGN ESSENTIAL HYPERTENSION: ICD-10-CM

## 2024-06-05 DIAGNOSIS — E11.65 TYPE 2 DIABETES MELLITUS WITH HYPERGLYCEMIA, WITHOUT LONG-TERM CURRENT USE OF INSULIN (MULTI): ICD-10-CM

## 2024-06-05 DIAGNOSIS — R31.9 HEMATURIA, UNSPECIFIED TYPE: ICD-10-CM

## 2024-06-05 DIAGNOSIS — E66.01 CLASS 2 SEVERE OBESITY WITH SERIOUS COMORBIDITY AND BODY MASS INDEX (BMI) OF 35.0 TO 35.9 IN ADULT, UNSPECIFIED OBESITY TYPE (MULTI): ICD-10-CM

## 2024-06-05 PROBLEM — F41.9 ANXIETY: Status: RESOLVED | Noted: 2023-06-05 | Resolved: 2024-06-05

## 2024-06-05 LAB
APPEARANCE UR: ABNORMAL
BILIRUB UR STRIP.AUTO-MCNC: NEGATIVE MG/DL
COLOR UR: YELLOW
GLUCOSE UR STRIP.AUTO-MCNC: NORMAL MG/DL
HOLD SPECIMEN: NORMAL
KETONES UR STRIP.AUTO-MCNC: NEGATIVE MG/DL
LEUKOCYTE ESTERASE UR QL STRIP.AUTO: ABNORMAL
MUCOUS THREADS #/AREA URNS AUTO: ABNORMAL /LPF
NITRITE UR QL STRIP.AUTO: NEGATIVE
PH UR STRIP.AUTO: 5.5 [PH]
PROT UR STRIP.AUTO-MCNC: NEGATIVE MG/DL
RBC # UR STRIP.AUTO: NEGATIVE /UL
RBC #/AREA URNS AUTO: ABNORMAL /HPF
SP GR UR STRIP.AUTO: 1.02
SQUAMOUS #/AREA URNS AUTO: ABNORMAL /HPF
UROBILINOGEN UR STRIP.AUTO-MCNC: NORMAL MG/DL
WBC #/AREA URNS AUTO: >50 /HPF

## 2024-06-05 PROCEDURE — 1160F RVW MEDS BY RX/DR IN RCRD: CPT | Performed by: NURSE PRACTITIONER

## 2024-06-05 PROCEDURE — 99214 OFFICE O/P EST MOD 30 MIN: CPT | Performed by: NURSE PRACTITIONER

## 2024-06-05 PROCEDURE — 3079F DIAST BP 80-89 MM HG: CPT | Performed by: NURSE PRACTITIONER

## 2024-06-05 PROCEDURE — 3008F BODY MASS INDEX DOCD: CPT | Performed by: NURSE PRACTITIONER

## 2024-06-05 PROCEDURE — 1157F ADVNC CARE PLAN IN RCRD: CPT | Performed by: NURSE PRACTITIONER

## 2024-06-05 PROCEDURE — 87086 URINE CULTURE/COLONY COUNT: CPT

## 2024-06-05 PROCEDURE — 1159F MED LIST DOCD IN RCRD: CPT | Performed by: NURSE PRACTITIONER

## 2024-06-05 PROCEDURE — 1036F TOBACCO NON-USER: CPT | Performed by: NURSE PRACTITIONER

## 2024-06-05 PROCEDURE — 3074F SYST BP LT 130 MM HG: CPT | Performed by: NURSE PRACTITIONER

## 2024-06-05 RX ORDER — NITROFURANTOIN 25; 75 MG/1; MG/1
100 CAPSULE ORAL 2 TIMES DAILY
Qty: 10 CAPSULE | Refills: 0 | Status: SHIPPED | OUTPATIENT
Start: 2024-06-05 | End: 2024-06-10

## 2024-06-05 ASSESSMENT — ENCOUNTER SYMPTOMS: DYSURIA: 1

## 2024-06-06 LAB — BACTERIA UR CULT: NORMAL

## 2024-07-09 ASSESSMENT — ENCOUNTER SYMPTOMS
SINUS PRESSURE: 0
HEADACHES: 0
CHEST TIGHTNESS: 0
ARTHRALGIAS: 1
DIARRHEA: 0
COUGH: 0
SORE THROAT: 0
ABDOMINAL DISTENTION: 0
LIGHT-HEADEDNESS: 0
ABDOMINAL PAIN: 0
HEMATOLOGIC/LYMPHATIC NEGATIVE: 1
WEAKNESS: 0
CHILLS: 0
FATIGUE: 0
ACTIVITY CHANGE: 0
NUMBNESS: 0
PALPITATIONS: 0
VOMITING: 0
NAUSEA: 0
WHEEZING: 0
ENDOCRINE NEGATIVE: 1
SINUS PAIN: 0
FEVER: 0
PSYCHIATRIC NEGATIVE: 1
DIZZINESS: 0
ALLERGIC/IMMUNOLOGIC NEGATIVE: 1
EYES NEGATIVE: 1
SHORTNESS OF BREATH: 0
CONSTIPATION: 0
RHINORRHEA: 0

## 2024-07-09 NOTE — PROGRESS NOTES
Subjective   Reason for Visit: Leona Bourne is an 69 y.o. female here for a Medicare Wellness visit.     Past Medical, Surgical, and Family History reviewed and updated in chart.    Reviewed all medications by prescribing practitioner or clinical pharmacist (such as prescriptions, OTCs, herbal therapies and supplements) and documented in the medical record.    Medicare physical  Diabetes, controlled.  Taking Ozempic 0.5 mg weekly.  25 pound weight loss since January.  Chronic diastolic CHF, euvolemic. Follows with Dr. Doyle. Taking Entresto 24/26 mg twice a day. Stress test 12/2022 no ischemia. Echo 11/2022 showed decreased LV systolic function, EF 50%, LVH with diastolic dysfunction, mild-moderate mitral regurg, mild tricuspid regurg, LV segmental wall motion abnormalities. Cardiac cath normal coronaries.   Hypothyroidism, taking levothyroxine 150 mcg.  Monitor TSH  Bilateral knee arthritis. Uses naproxen when arthritis flares up. Follows with Dr. Puente as needed.  Obesity with HTN, CHF, DM, BMI 35, Discussed lifestyle changes including portion control, food choices, and increased activity.  Hyperlipidemia. LDL greater than goal of 75 for diabetes. However she just had a cardiac cath with normal coronaries.  Recommend low fat/low cholesterol diet, eat more fresh foods, avoid processed/prepackaged/fast foods, increase physical activity, weight loss. Recommend Mediterranean diet.  Due for bone density  Due for complete labs  Recommend RSV vaccine and Tdap  I spent 15 minutes obtaining and discussing depression screening using PHQ-2 questions with results documented in the chart.   I spent greater than 15 minutes face-to-face with individual providing recommendations for nutrition choices and exercise plan to help achieve weight reduction.     Preventive:  CRC screen: Colonoscopy 12/2019  Mammogram: 5/2024, negative  DEXA scan: 11/2021 normal  PAP:  Stress test: 12/2022  Cardiac cath: 12/2022        The  10-year ASCVD risk score (Thais GARCES, et al., 2019) is: 21.9%    Values used to calculate the score:      Age: 69 years      Sex: Female      Is Non- : No      Diabetic: Yes      Tobacco smoker: No      Systolic Blood Pressure: 128 mmHg      Is BP treated: Yes      HDL Cholesterol: 62.9 mg/dL      Total Cholesterol: 184 mg/dL    Patient Care Team:  JORGE Morrison-CNP, DNP as PCP - General     Lab on 07/22/2024   Component Date Value Ref Range Status    WBC 07/22/2024 6.5  4.4 - 11.3 x10*3/uL Final    nRBC 07/22/2024 0.0  0.0 - 0.0 /100 WBCs Final    RBC 07/22/2024 4.89  4.00 - 5.20 x10*6/uL Final    Hemoglobin 07/22/2024 14.6  12.0 - 16.0 g/dL Final    Hematocrit 07/22/2024 44.1  36.0 - 46.0 % Final    MCV 07/22/2024 90  80 - 100 fL Final    MCH 07/22/2024 29.9  26.0 - 34.0 pg Final    MCHC 07/22/2024 33.1  32.0 - 36.0 g/dL Final    RDW 07/22/2024 13.1  11.5 - 14.5 % Final    Platelets 07/22/2024 340  150 - 450 x10*3/uL Final    Neutrophils % 07/22/2024 64.6  40.0 - 80.0 % Final    Immature Granulocytes %, Automated 07/22/2024 0.3  0.0 - 0.9 % Final    Immature Granulocyte Count (IG) includes promyelocytes, myelocytes and metamyelocytes but does not include bands. Percent differential counts (%) should be interpreted in the context of the absolute cell counts (cells/UL).    Lymphocytes % 07/22/2024 24.0  13.0 - 44.0 % Final    Monocytes % 07/22/2024 6.7  2.0 - 10.0 % Final    Eosinophils % 07/22/2024 2.9  0.0 - 6.0 % Final    Basophils % 07/22/2024 1.5  0.0 - 2.0 % Final    Neutrophils Absolute 07/22/2024 4.17  1.20 - 7.70 x10*3/uL Final    Percent differential counts (%) should be interpreted in the context of the absolute cell counts (cells/uL).    Immature Granulocytes Absolute, Au* 07/22/2024 0.02  0.00 - 0.70 x10*3/uL Final    Lymphocytes Absolute 07/22/2024 1.55  1.20 - 4.80 x10*3/uL Final    Monocytes Absolute 07/22/2024 0.43  0.10 - 1.00 x10*3/uL Final    Eosinophils Absolute  07/22/2024 0.19  0.00 - 0.70 x10*3/uL Final    Basophils Absolute 07/22/2024 0.10  0.00 - 0.10 x10*3/uL Final    Glucose 07/22/2024 106 (H)  74 - 99 mg/dL Final    Sodium 07/22/2024 137  136 - 145 mmol/L Final    Potassium 07/22/2024 4.2  3.5 - 5.3 mmol/L Final    Chloride 07/22/2024 104  98 - 107 mmol/L Final    Bicarbonate 07/22/2024 24  21 - 32 mmol/L Final    Anion Gap 07/22/2024 13  10 - 20 mmol/L Final    Urea Nitrogen 07/22/2024 21  6 - 23 mg/dL Final    Creatinine 07/22/2024 0.84  0.50 - 1.05 mg/dL Final    eGFR 07/22/2024 75  >60 mL/min/1.73m*2 Final    Calculations of estimated GFR are performed using the 2021 CKD-EPI Study Refit equation without the race variable for the IDMS-Traceable creatinine methods.  https://jasn.asnjournals.org/content/early/2021/09/22/ASN.0512942380    Calcium 07/22/2024 9.4  8.6 - 10.3 mg/dL Final    Albumin 07/22/2024 3.9  3.4 - 5.0 g/dL Final    Alkaline Phosphatase 07/22/2024 78  33 - 136 U/L Final    Total Protein 07/22/2024 7.5  6.4 - 8.2 g/dL Final    AST 07/22/2024 21  9 - 39 U/L Final    Bilirubin, Total 07/22/2024 0.9  0.0 - 1.2 mg/dL Final    ALT 07/22/2024 21  7 - 45 U/L Final    Patients treated with Sulfasalazine may generate falsely decreased results for ALT.    Cholesterol 07/22/2024 184  0 - 199 mg/dL Final          Age      Desirable   Borderline High   High     0-19 Y     0 - 169       170 - 199     >/= 200    20-24 Y     0 - 189       190 - 224     >/= 225         >24 Y     0 - 199       200 - 239     >/= 240   **All ranges are based on fasting samples. Specific   therapeutic targets will vary based on patient-specific   cardiac risk.    Pediatric guidelines reference:Pediatrics 2011, 128(S5).Adult guidelines reference: NCEP ATPIII Guidelines,SYLWIA 2001, 258:2486-97    Venipuncture immediately after or during the administration of Metamizole may lead to falsely low results. Testing should be performed immediately prior to Metamizole dosing.    HDL-Cholesterol  07/22/2024 62.9  mg/dL Final      Age       Very Low   Low     Normal    High    0-19 Y    < 35      < 40     40-45     ----  20-24 Y    ----     < 40      >45      ----        >24 Y      ----     < 40     40-60      >60      Cholesterol/HDL Ratio 07/22/2024 2.9   Final      Ref Values  Desirable  < 3.4  High Risk  > 5.0    LDL Calculated 07/22/2024 106 (H)  <=99 mg/dL Final                                Near   Borderline      AGE      Desirable  Optimal    High     High     Very High     0-19 Y     0 - 109     ---    110-129   >/= 130     ----    20-24 Y     0 - 119     ---    120-159   >/= 160     ----      >24 Y     0 -  99   100-129  130-159   160-189     >/=190      VLDL 07/22/2024 16  0 - 40 mg/dL Final    Triglycerides 07/22/2024 78  0 - 149 mg/dL Final       Age         Desirable   Borderline High   High     Very High   0 D-90 D    19 - 174         ----         ----        ----  91 D- 9 Y     0 -  74        75 -  99     >/= 100      ----    10-19 Y     0 -  89        90 - 129     >/= 130      ----    20-24 Y     0 - 114       115 - 149     >/= 150      ----         >24 Y     0 - 149       150 - 199    200- 499    >/= 500    Venipuncture immediately after or during the administration of Metamizole may lead to falsely low results. Testing should be performed immediately prior to Metamizole dosing.    Non HDL Cholesterol 07/22/2024 121  0 - 149 mg/dL Final          Age       Desirable   Borderline High   High     Very High     0-19 Y     0 - 119       120 - 144     >/= 145    >/= 160    20-24 Y     0 - 149       150 - 189     >/= 190      ----         >24 Y    30 mg/dL above LDL Cholesterol goal      Thyroid Stimulating Hormone 07/22/2024 0.22 (L)  0.44 - 3.98 mIU/L Final    Vitamin D, 25-Hydroxy, Total 07/22/2024 34  30 - 100 ng/mL Final    Albumin, Urine Random 07/26/2024 15.1  Not established mg/L Final    Creatinine, Urine Random 07/26/2024 92.0  20.0 - 320.0 mg/dL Final    Albumin/Creatinine Ratio  07/26/2024 16.4  <30.0 ug/mg Creat Final    Thyroxine, Free 07/22/2024 1.03  0.61 - 1.12 ng/dL Final   Office Visit on 07/22/2024   Component Date Value Ref Range Status    POC Fingerstick Blood Glucose 07/22/2024 107 (A)  70 - 100 mg/dl Final    POC HEMOGLOBIN A1c 07/22/2024 5.9  4.2 - 6.5 % Final   Lab on 06/05/2024   Component Date Value Ref Range Status    Color, Urine 06/05/2024 Yellow  Light-Yellow, Yellow, Dark-Yellow Final    Appearance, Urine 06/05/2024 Turbid (N)  Clear Final    Specific Gravity, Urine 06/05/2024 1.025  1.005 - 1.035 Final    pH, Urine 06/05/2024 5.5  5.0, 5.5, 6.0, 6.5, 7.0, 7.5, 8.0 Final    Protein, Urine 06/05/2024 NEGATIVE  NEGATIVE, 10 (TRACE), 20 (TRACE) mg/dL Final    Glucose, Urine 06/05/2024 Normal  Normal mg/dL Final    Blood, Urine 06/05/2024 NEGATIVE  NEGATIVE Final    Ketones, Urine 06/05/2024 NEGATIVE  NEGATIVE mg/dL Final    Bilirubin, Urine 06/05/2024 NEGATIVE  NEGATIVE Final    Urobilinogen, Urine 06/05/2024 Normal  Normal mg/dL Final    Nitrite, Urine 06/05/2024 NEGATIVE  NEGATIVE Final    Leukocyte Esterase, Urine 06/05/2024 500 Surendra/µL (A)  NEGATIVE Final    Extra Tube 06/05/2024 Hold for add-ons.   Final    Auto resulted.    WBC, Urine 06/05/2024 >50 (A)  1-5, NONE /HPF Final    RBC, Urine 06/05/2024 6-10 (A)  NONE, 1-2, 3-5 /HPF Final    Squamous Epithelial Cells, Urine 06/05/2024 1-9 (SPARSE)  Reference range not established. /HPF Final    Mucus, Urine 06/05/2024 FEW  Reference range not established. /LPF Final    Urine Culture 06/05/2024 Normal genitourinary mikel   Final       BI mammo bilateral screening tomosynthesis  Narrative: Interpreted By:  Cas Schmitt,   STUDY:  BI MAMMO BILATERAL SCREENING TOMOSYNTHESIS;  5/2/2024 10:24 am      ACCESSION NUMBER(S):  [id number]      ORDERING CLINICIAN:  PABLITO LEONARD      INDICATION:  Screening. History of a benign left breast biopsy decades ago and a  maternal aunt with breast cancer.       COMPARISON:  01/09/2023 and 07/27/2021 bilateral screening mammograms with  tomosynthesis.      FINDINGS:  2D and tomosynthesis images were reviewed at 1 mm slice thickness.      Density:  The breast tissue is heterogeneously dense, which may  obscure small masses.      No suspicious masses or calcifications are identified.      Impression: No mammographic evidence of malignancy.      BI-RADS CATEGORY:      BI-RADS Category:  1 Negative.  Recommendation:  Annual Screening.  Recommended Date:  1 Year.  Laterality:  Bilateral.      For any future breast imaging appointments, please call [phone] ([phone]).              Based on the Tyrer-Cuzick model for breast cancer risk assessment,  the patient's lifetime risk of breast cancer is 6.9%. Patients with  over a 20% lifetime risk of developing breast cancer may benefit from  additional screening with breast MRI or ultrasound. Please note that  this estimate is based on responses provided on the patient  questionnaire. For more information regarding high risk consultation,  please call [phone].      MACRO:  None      Signed by: Cas Schmitt 5/2/2024 5:41 PM  Dictation workstation:   [id number]       Review of Systems   Constitutional:  Negative for activity change, chills, fatigue and fever.   HENT:  Negative for congestion, rhinorrhea, sinus pressure, sinus pain and sore throat.    Eyes: Negative.    Respiratory:  Negative for cough, chest tightness, shortness of breath and wheezing.    Cardiovascular:  Negative for chest pain, palpitations and leg swelling.   Gastrointestinal:  Negative for abdominal distention, abdominal pain, constipation, diarrhea, nausea and vomiting.   Endocrine: Negative.    Musculoskeletal:  Positive for arthralgias.   Skin: Negative.    Allergic/Immunologic: Negative.    Neurological:  Negative for dizziness, syncope, weakness, light-headedness, numbness and headaches.   Hematological: Negative.    Psychiatric/Behavioral:  "Negative.     All other systems reviewed and are negative.      Objective   Vitals:  /78   Pulse 69   Temp 36.2 °C (97.2 °F)   Ht 1.727 m (5' 8\")   Wt 105 kg (230 lb 6.4 oz)   SpO2 96%   BMI 35.03 kg/m²       Physical Exam  Vitals and nursing note reviewed.   Constitutional:       General: She is not in acute distress.     Appearance: Normal appearance. She is obese. She is not ill-appearing.   HENT:      Head: Normocephalic and atraumatic.      Right Ear: Tympanic membrane, ear canal and external ear normal.      Left Ear: Tympanic membrane, ear canal and external ear normal.      Nose: Nose normal.      Mouth/Throat:      Mouth: Mucous membranes are moist.      Pharynx: Oropharynx is clear.   Eyes:      Pupils: Pupils are equal, round, and reactive to light.   Cardiovascular:      Rate and Rhythm: Normal rate and regular rhythm.      Pulses: Normal pulses.      Heart sounds: Normal heart sounds. No murmur heard.  Pulmonary:      Effort: Pulmonary effort is normal. No respiratory distress.      Breath sounds: Normal breath sounds. No wheezing.   Abdominal:      General: Bowel sounds are normal. There is no distension.      Palpations: Abdomen is soft.      Tenderness: There is no abdominal tenderness.   Musculoskeletal:         General: No tenderness. Normal range of motion.      Cervical back: Normal range of motion and neck supple.      Right lower leg: No edema.      Left lower leg: No edema.   Skin:     General: Skin is warm and dry.      Capillary Refill: Capillary refill takes less than 2 seconds.      Coloration: Skin is not jaundiced.   Neurological:      General: No focal deficit present.      Mental Status: She is alert and oriented to person, place, and time.      Motor: No weakness.   Psychiatric:         Mood and Affect: Mood normal.         Behavior: Behavior normal.         Thought Content: Thought content normal.         Judgment: Judgment normal.         Assessment/Plan    Leona was " seen today for medicare annual wellness visit subsequent and diabetes.  Diagnoses and all orders for this visit:  Routine general medical examination at health care facility (Primary)  Type 2 diabetes mellitus with hyperglycemia, without long-term current use of insulin (Multi)  -     POCT fingerstick glucose manually resulted  -     POCT glycosylated hemoglobin (Hb A1C) manually resulted  -     semaglutide 0.25 mg or 0.5 mg (2 mg/3 mL) pen injector; Inject 0.5 mg under the skin 1 (one) time per week in the early morning..  Menopause  -     XR DEXA bone density; Future  Class 2 severe obesity with serious comorbidity and body mass index (BMI) of 35.0 to 35.9 in adult, unspecified obesity type (Multi)  Benign essential hypertension  Chronic diastolic congestive heart failure (Multi)  Acquired hypothyroidism  Advanced directives, counseling/discussion  Depression screening  Other orders  -     Follow Up In Primary Care - Medicare Annual  -     Follow Up In Primary Care - Established; Future     # Diabetes, controlled, A1c 5.9%  -Continue Ozempic  -Low fat/cholesterol, low sodium, carbohydrate controlled diet  -Exercise as tolerated 150 minutes/week, gradually increase activity  -Weight loss  -Regular follow-up with ophthalmology and podiatry  # Chronic CHF, euvolemic, compensated  -Following with cardiology  -Taking Entresto 24/26 mg twice a day  -weight loss  -2 GM sodium diet  -Weigh yourself every morning before breakfast  -Call the office if you have a weight gain of 3 or more pounds in one day or 5 or more pounds in one week  # Hypothyroidism  -Taking Levothyroxine 150 mcg daily  -monitor TSH, adjust levothyroxine accordingly  # Bilateral knee arthritis  -Tylenol 650 mg every 8 hours as needed for pain or Naproxen as needed  # Obesity, BMI 35  -Continue Ozempic  -Avoid sweets and sugary drinks  -Drink plenty of water  -Avoid processed foods and refined foods  -Eat fruits, vegetables, lean protein, whole  grains  -Increase your activity level    Follow-up with new provider in 6 months and as needed

## 2024-07-12 DIAGNOSIS — E03.9 ACQUIRED HYPOTHYROIDISM: ICD-10-CM

## 2024-07-12 RX ORDER — LEVOTHYROXINE SODIUM 150 UG/1
150 TABLET ORAL DAILY
Qty: 90 TABLET | Refills: 0 | Status: SHIPPED | OUTPATIENT
Start: 2024-07-12

## 2024-07-22 ENCOUNTER — LAB (OUTPATIENT)
Dept: LAB | Facility: LAB | Age: 69
End: 2024-07-22
Payer: MEDICARE

## 2024-07-22 ENCOUNTER — APPOINTMENT (OUTPATIENT)
Dept: PRIMARY CARE | Facility: CLINIC | Age: 69
End: 2024-07-22
Payer: MEDICARE

## 2024-07-22 VITALS
WEIGHT: 230.4 LBS | TEMPERATURE: 97.2 F | SYSTOLIC BLOOD PRESSURE: 128 MMHG | BODY MASS INDEX: 34.92 KG/M2 | DIASTOLIC BLOOD PRESSURE: 78 MMHG | HEART RATE: 69 BPM | OXYGEN SATURATION: 96 % | HEIGHT: 68 IN

## 2024-07-22 DIAGNOSIS — E03.9 ACQUIRED HYPOTHYROIDISM: ICD-10-CM

## 2024-07-22 DIAGNOSIS — I50.32 CHRONIC DIASTOLIC CONGESTIVE HEART FAILURE (MULTI): ICD-10-CM

## 2024-07-22 DIAGNOSIS — E66.01 CLASS 2 SEVERE OBESITY WITH SERIOUS COMORBIDITY AND BODY MASS INDEX (BMI) OF 35.0 TO 35.9 IN ADULT, UNSPECIFIED OBESITY TYPE (MULTI): ICD-10-CM

## 2024-07-22 DIAGNOSIS — Z00.00 ROUTINE GENERAL MEDICAL EXAMINATION AT HEALTH CARE FACILITY: Primary | ICD-10-CM

## 2024-07-22 DIAGNOSIS — Z71.89 ADVANCED DIRECTIVES, COUNSELING/DISCUSSION: ICD-10-CM

## 2024-07-22 DIAGNOSIS — E11.65 TYPE 2 DIABETES MELLITUS WITH HYPERGLYCEMIA, WITHOUT LONG-TERM CURRENT USE OF INSULIN (MULTI): ICD-10-CM

## 2024-07-22 DIAGNOSIS — Z78.0 MENOPAUSE: ICD-10-CM

## 2024-07-22 DIAGNOSIS — E55.9 VITAMIN D DEFICIENCY: ICD-10-CM

## 2024-07-22 DIAGNOSIS — E78.2 MIXED HYPERLIPIDEMIA: ICD-10-CM

## 2024-07-22 DIAGNOSIS — I10 BENIGN ESSENTIAL HYPERTENSION: ICD-10-CM

## 2024-07-22 DIAGNOSIS — Z13.31 DEPRESSION SCREENING: ICD-10-CM

## 2024-07-22 LAB
25(OH)D3 SERPL-MCNC: 34 NG/ML (ref 30–100)
ALBUMIN SERPL BCP-MCNC: 3.9 G/DL (ref 3.4–5)
ALP SERPL-CCNC: 78 U/L (ref 33–136)
ALT SERPL W P-5'-P-CCNC: 21 U/L (ref 7–45)
ANION GAP SERPL CALC-SCNC: 13 MMOL/L (ref 10–20)
AST SERPL W P-5'-P-CCNC: 21 U/L (ref 9–39)
BASOPHILS # BLD AUTO: 0.1 X10*3/UL (ref 0–0.1)
BASOPHILS NFR BLD AUTO: 1.5 %
BILIRUB SERPL-MCNC: 0.9 MG/DL (ref 0–1.2)
BUN SERPL-MCNC: 21 MG/DL (ref 6–23)
CALCIUM SERPL-MCNC: 9.4 MG/DL (ref 8.6–10.3)
CHLORIDE SERPL-SCNC: 104 MMOL/L (ref 98–107)
CHOLEST SERPL-MCNC: 184 MG/DL (ref 0–199)
CHOLESTEROL/HDL RATIO: 2.9
CO2 SERPL-SCNC: 24 MMOL/L (ref 21–32)
CREAT SERPL-MCNC: 0.84 MG/DL (ref 0.5–1.05)
EGFRCR SERPLBLD CKD-EPI 2021: 75 ML/MIN/1.73M*2
EOSINOPHIL # BLD AUTO: 0.19 X10*3/UL (ref 0–0.7)
EOSINOPHIL NFR BLD AUTO: 2.9 %
ERYTHROCYTE [DISTWIDTH] IN BLOOD BY AUTOMATED COUNT: 13.1 % (ref 11.5–14.5)
GLUCOSE SERPL-MCNC: 106 MG/DL (ref 74–99)
HCT VFR BLD AUTO: 44.1 % (ref 36–46)
HDLC SERPL-MCNC: 62.9 MG/DL
HGB BLD-MCNC: 14.6 G/DL (ref 12–16)
IMM GRANULOCYTES # BLD AUTO: 0.02 X10*3/UL (ref 0–0.7)
IMM GRANULOCYTES NFR BLD AUTO: 0.3 % (ref 0–0.9)
LDLC SERPL CALC-MCNC: 106 MG/DL
LYMPHOCYTES # BLD AUTO: 1.55 X10*3/UL (ref 1.2–4.8)
LYMPHOCYTES NFR BLD AUTO: 24 %
MCH RBC QN AUTO: 29.9 PG (ref 26–34)
MCHC RBC AUTO-ENTMCNC: 33.1 G/DL (ref 32–36)
MCV RBC AUTO: 90 FL (ref 80–100)
MONOCYTES # BLD AUTO: 0.43 X10*3/UL (ref 0.1–1)
MONOCYTES NFR BLD AUTO: 6.7 %
NEUTROPHILS # BLD AUTO: 4.17 X10*3/UL (ref 1.2–7.7)
NEUTROPHILS NFR BLD AUTO: 64.6 %
NON HDL CHOLESTEROL: 121 MG/DL (ref 0–149)
NRBC BLD-RTO: 0 /100 WBCS (ref 0–0)
PLATELET # BLD AUTO: 340 X10*3/UL (ref 150–450)
POC FINGERSTICK BLOOD GLUCOSE: 107 MG/DL (ref 70–100)
POC HEMOGLOBIN A1C: 5.9 % (ref 4.2–6.5)
POTASSIUM SERPL-SCNC: 4.2 MMOL/L (ref 3.5–5.3)
PROT SERPL-MCNC: 7.5 G/DL (ref 6.4–8.2)
RBC # BLD AUTO: 4.89 X10*6/UL (ref 4–5.2)
SODIUM SERPL-SCNC: 137 MMOL/L (ref 136–145)
T4 FREE SERPL-MCNC: 1.03 NG/DL (ref 0.61–1.12)
TRIGL SERPL-MCNC: 78 MG/DL (ref 0–149)
TSH SERPL-ACNC: 0.22 MIU/L (ref 0.44–3.98)
VLDL: 16 MG/DL (ref 0–40)
WBC # BLD AUTO: 6.5 X10*3/UL (ref 4.4–11.3)

## 2024-07-22 PROCEDURE — 82306 VITAMIN D 25 HYDROXY: CPT

## 2024-07-22 PROCEDURE — 36415 COLL VENOUS BLD VENIPUNCTURE: CPT

## 2024-07-22 ASSESSMENT — ACTIVITIES OF DAILY LIVING (ADL)
DRESSING: INDEPENDENT
BATHING: INDEPENDENT
TAKING_MEDICATION: INDEPENDENT
GROCERY_SHOPPING: INDEPENDENT
MANAGING_FINANCES: INDEPENDENT
DOING_HOUSEWORK: INDEPENDENT

## 2024-07-25 DIAGNOSIS — E03.9 ACQUIRED HYPOTHYROIDISM: ICD-10-CM

## 2024-07-25 RX ORDER — LEVOTHYROXINE SODIUM 137 UG/1
137 TABLET ORAL DAILY
Qty: 90 TABLET | Refills: 1 | Status: SHIPPED | OUTPATIENT
Start: 2024-07-25

## 2024-07-26 ENCOUNTER — LAB (OUTPATIENT)
Dept: LAB | Facility: LAB | Age: 69
End: 2024-07-26
Payer: MEDICARE

## 2024-07-26 LAB
CREAT UR-MCNC: 92 MG/DL (ref 20–320)
MICROALBUMIN UR-MCNC: 15.1 MG/L
MICROALBUMIN/CREAT UR: 16.4 UG/MG CREAT

## 2024-07-26 PROCEDURE — 82043 UR ALBUMIN QUANTITATIVE: CPT

## 2024-07-26 PROCEDURE — 82570 ASSAY OF URINE CREATININE: CPT

## 2024-08-19 ENCOUNTER — LAB (OUTPATIENT)
Dept: LAB | Facility: LAB | Age: 69
End: 2024-08-19
Payer: MEDICARE

## 2024-08-19 DIAGNOSIS — E78.49 OTHER HYPERLIPIDEMIA: ICD-10-CM

## 2024-08-19 DIAGNOSIS — I42.8 OTHER CARDIOMYOPATHIES (MULTI): ICD-10-CM

## 2024-08-19 DIAGNOSIS — R94.31 ABNORMAL ELECTROCARDIOGRAM (ECG) (EKG): Primary | ICD-10-CM

## 2024-08-19 LAB
ALBUMIN SERPL BCP-MCNC: 4.2 G/DL (ref 3.4–5)
ALP SERPL-CCNC: 84 U/L (ref 33–136)
ALT SERPL W P-5'-P-CCNC: 13 U/L (ref 7–45)
ANION GAP SERPL CALC-SCNC: 11 MMOL/L (ref 10–20)
AST SERPL W P-5'-P-CCNC: 14 U/L (ref 9–39)
BILIRUB SERPL-MCNC: 0.7 MG/DL (ref 0–1.2)
BNP SERPL-MCNC: 14 PG/ML (ref 0–99)
BUN SERPL-MCNC: 16 MG/DL (ref 6–23)
CALCIUM SERPL-MCNC: 9.5 MG/DL (ref 8.6–10.3)
CHLORIDE SERPL-SCNC: 103 MMOL/L (ref 98–107)
CO2 SERPL-SCNC: 27 MMOL/L (ref 21–32)
CREAT SERPL-MCNC: 0.93 MG/DL (ref 0.5–1.05)
EGFRCR SERPLBLD CKD-EPI 2021: 66 ML/MIN/1.73M*2
GLUCOSE SERPL-MCNC: 129 MG/DL (ref 74–99)
POTASSIUM SERPL-SCNC: 4 MMOL/L (ref 3.5–5.3)
PROT SERPL-MCNC: 7.3 G/DL (ref 6.4–8.2)
SODIUM SERPL-SCNC: 137 MMOL/L (ref 136–145)

## 2024-08-19 PROCEDURE — 36415 COLL VENOUS BLD VENIPUNCTURE: CPT

## 2024-09-11 ENCOUNTER — TELEPHONE (OUTPATIENT)
Dept: PRIMARY CARE | Facility: CLINIC | Age: 69
End: 2024-09-11
Payer: MEDICARE

## 2024-09-11 DIAGNOSIS — F40.243 FEAR OF FLYING: Primary | ICD-10-CM

## 2024-09-11 RX ORDER — LORAZEPAM 0.5 MG/1
0.5 TABLET ORAL 2 TIMES DAILY PRN
Qty: 6 TABLET | Refills: 0 | Status: SHIPPED | OUTPATIENT
Start: 2024-09-11

## 2024-12-10 NOTE — PROGRESS NOTES
Subjective     HPI   Leona Bourne is a 69 y.o. year old female patient with presenting to clinic with concern for   Chief Complaint   Patient presents with    New Patient Visit    colonoscopy referral       HTN  BP Readings from Last 5 Encounters:   12/11/24 114/68   07/22/24 128/78   06/05/24 128/82   01/22/24 136/82   06/07/23 124/82     CHF  Dr Doyle  -entresto 24/26 bid  Echo 11/2022 decreased LV systolic function, EF 50%, LVH with diastolic dysfunction, mild-moderate mitral regurg, mild tricuspid regurg, LV segmental wall motion abnormalities   Stress test 12/2022 wnl no ischemia  Cardiac cath wnl 12/2022    DM2 with cardiovascular benefits & fatty liver diseaes   -ozempic 0.5mg weekly  Lab Results   Component Value Date    HGBA1C 5.9 07/22/2024   Not on  ASA  Not on statins  ARB- Valsartan (entresto)  Annual optho      Wt Readings from Last 5 Encounters:   12/11/24 107 kg (236 lb 12.8 oz)   07/22/24 105 kg (230 lb 6.4 oz)   06/05/24 106 kg (233 lb 4.8 oz)   05/02/24 109 kg (240 lb)   01/22/24 116 kg (255 lb 11.2 oz)     Hypothyroidism, Hashimoto  -levothyroxine  Lab Results   Component Value Date    TSH 0.22 (L) 07/22/2024     [facility] [place] [facility] for [facility]. - Not regularly on ativan.     Chronic knee pain   Dr Puente      Patient Active Problem List   Diagnosis    Benign essential hypertension    Chronic diastolic congestive heart failure    Hashimoto's thyroiditis    Hepatic steatosis    Hypothyroidism    Type 2 diabetes mellitus with hyperglycemia, without long-term current use of insulin    Vitamin D deficiency       Past Medical History:   Diagnosis Date    Achilles tendinitis, right leg 01/13/2020    Achilles tendinitis of right lower extremity    Body mass index (BMI) 37.0-37.9, adult 10/20/2020    Body mass index (BMI) of 37.0 to 37.9 in adult    Morbid (severe) obesity due to excess calories (Multi) 04/14/2021    Class 2 severe obesity due to  excess calories with serious comorbidity and body mass index (BMI) of 37.0 to 37.9 in adult    Morbid (severe) obesity due to excess calories (Multi) 04/14/2021    Class 2 severe obesity with serious comorbidity and body mass index (BMI) of 36.0 to 36.9 in adult    Personal history of diseases of the skin and subcutaneous tissue     History of rosacea    Personal history of other diseases of the circulatory system 04/14/2021    History of congestive heart failure    Personal history of other diseases of the digestive system 03/07/2016    History of esophageal reflux    Personal history of other diseases of the respiratory system 05/28/2015    History of acute sinusitis    Personal history of other malignant neoplasm of skin     Personal history of skin cancer    Personal history of other specified conditions     History of fibrocystic disease of breast    Rectal fistula     Rectal fistula    Right upper quadrant pain 03/07/2016    RUQ abdominal pain    Unspecified symptoms and signs involving the genitourinary system 06/09/2020    UTI symptoms      Past Surgical History:   Procedure Laterality Date    APPENDECTOMY  07/28/2014    Appendectomy    BREAST BIOPSY Left 1985    benign    HYSTERECTOMY  07/28/2014    Hysterectomy    OTHER SURGICAL HISTORY  07/28/2014    Reported Hx Of Breast Surgery For Biopsy    OTHER SURGICAL HISTORY  07/28/2014    Rectal Surgery Repair Of Perirectal Fistula      Family History   Problem Relation Name Age of Onset    Breast cancer Mother's Sister  60      Social History     Tobacco Use    Smoking status: Former     Types: Cigarettes    Smokeless tobacco: Never   Substance Use Topics    Alcohol use: Not Currently        Current Outpatient Medications:     levothyroxine (Synthroid, Levoxyl) 137 mcg tablet, Take 1 tablet (137 mcg) by mouth once daily., Disp: 90 tablet, Rfl: 1    LORazepam (Ativan) 0.5 mg tablet, Take 1 tablet (0.5 mg) by mouth 2 times a day as needed for anxiety., Disp: 6  "tablet, Rfl: 0    sacubitriL-valsartan (Entresto) 24-26 mg tablet, Take by mouth twice a day., Disp: , Rfl:     semaglutide 0.25 mg or 0.5 mg (2 mg/3 mL) pen injector, Inject 0.5 mg under the skin 1 (one) time per week in the early morning.., Disp: 3 mL, Rfl: 5     Review of Systems  Constitutional: Denies fever  HEENT: Denies ST, earache  CVS: Denies Chest pain  Pulmonary: Denies wheezing, SOB  GI: Denies N/V  : Denies dysuria  Musculoskeletal:  Denies myalgia  Neuro: Denies focal weakness or numbness.  Skin: Denies Rashes.  *Review of Systems is negative unless otherwise mentioned in HPI or ROS above.    Objective   /68   Pulse 100   Temp 36.2 °C (97.2 °F)   Ht 1.727 m (5' 8\")   Wt 107 kg (236 lb 12.8 oz)   SpO2 97%   BMI 36.01 kg/m²  reviewed Body mass index is 36.01 kg/m².     Physical Exam  Constitutional: NAD.  Resting comfortably.  Head: Atraumatic, normocephalic.  ENT: Moist oral mucosa. Nasal mucosa wnl.   Cardiac: Regular rate & rhythm.   Pulmonary: Lungs clear bilat  GI: Soft, Nontender, nondistended.   Musculoskeletal: No peripheral edema.   Skin: No evidence of trauma. No rashes  Psych: Intact judgement and insight.    .Assessment/Plan   Problem List Items Addressed This Visit    None  Visit Diagnoses         Codes    Screening for colon cancer    -  Primary Z12.11    Relevant Orders    Colonoscopy Screening; High Risk Patient; fam hx colon ca, father, last colonosc 5 yr    Asymptomatic postmenopausal state     Z78.0    Relevant Orders    XR DEXA bone density            "

## 2024-12-11 ENCOUNTER — APPOINTMENT (OUTPATIENT)
Dept: PRIMARY CARE | Facility: CLINIC | Age: 69
End: 2024-12-11
Payer: MEDICARE

## 2024-12-11 VITALS
DIASTOLIC BLOOD PRESSURE: 68 MMHG | HEIGHT: 68 IN | OXYGEN SATURATION: 97 % | SYSTOLIC BLOOD PRESSURE: 114 MMHG | WEIGHT: 236.8 LBS | TEMPERATURE: 97.2 F | HEART RATE: 100 BPM | BODY MASS INDEX: 35.89 KG/M2

## 2024-12-11 DIAGNOSIS — Z78.0 ASYMPTOMATIC POSTMENOPAUSAL STATE: ICD-10-CM

## 2024-12-11 DIAGNOSIS — Z12.11 SCREENING FOR COLON CANCER: Primary | ICD-10-CM

## 2024-12-11 PROCEDURE — 3008F BODY MASS INDEX DOCD: CPT | Performed by: PHYSICIAN ASSISTANT

## 2024-12-11 PROCEDURE — 3061F NEG MICROALBUMINURIA REV: CPT | Performed by: PHYSICIAN ASSISTANT

## 2024-12-11 PROCEDURE — 1160F RVW MEDS BY RX/DR IN RCRD: CPT | Performed by: PHYSICIAN ASSISTANT

## 2024-12-11 PROCEDURE — 1157F ADVNC CARE PLAN IN RCRD: CPT | Performed by: PHYSICIAN ASSISTANT

## 2024-12-11 PROCEDURE — 1036F TOBACCO NON-USER: CPT | Performed by: PHYSICIAN ASSISTANT

## 2024-12-11 PROCEDURE — 1159F MED LIST DOCD IN RCRD: CPT | Performed by: PHYSICIAN ASSISTANT

## 2024-12-11 PROCEDURE — 99213 OFFICE O/P EST LOW 20 MIN: CPT | Performed by: PHYSICIAN ASSISTANT

## 2024-12-11 PROCEDURE — 3049F LDL-C 100-129 MG/DL: CPT | Performed by: PHYSICIAN ASSISTANT

## 2024-12-11 PROCEDURE — 3074F SYST BP LT 130 MM HG: CPT | Performed by: PHYSICIAN ASSISTANT

## 2024-12-11 PROCEDURE — 3078F DIAST BP <80 MM HG: CPT | Performed by: PHYSICIAN ASSISTANT

## 2024-12-11 ASSESSMENT — PATIENT HEALTH QUESTIONNAIRE - PHQ9
SUM OF ALL RESPONSES TO PHQ9 QUESTIONS 1 AND 2: 0
2. FEELING DOWN, DEPRESSED OR HOPELESS: NOT AT ALL
1. LITTLE INTEREST OR PLEASURE IN DOING THINGS: NOT AT ALL

## 2024-12-12 ENCOUNTER — PHARMACY VISIT (OUTPATIENT)
Dept: PHARMACY | Facility: CLINIC | Age: 69
End: 2024-12-12
Payer: COMMERCIAL

## 2024-12-12 DIAGNOSIS — Z12.11 SPECIAL SCREENING FOR MALIGNANT NEOPLASMS, COLON: ICD-10-CM

## 2024-12-12 PROCEDURE — RXMED WILLOW AMBULATORY MEDICATION CHARGE

## 2024-12-12 RX ORDER — SODIUM, POTASSIUM,MAG SULFATES 17.5-3.13G
SOLUTION, RECONSTITUTED, ORAL ORAL
Qty: 354 ML | Refills: 0 | Status: SHIPPED | OUTPATIENT
Start: 2024-12-12

## 2025-01-22 ENCOUNTER — CLINICAL SUPPORT (OUTPATIENT)
Dept: PRIMARY CARE | Facility: CLINIC | Age: 70
End: 2025-01-22
Payer: MEDICARE

## 2025-01-22 ENCOUNTER — APPOINTMENT (OUTPATIENT)
Dept: PRIMARY CARE | Facility: CLINIC | Age: 70
End: 2025-01-22
Payer: MEDICARE

## 2025-01-22 DIAGNOSIS — E11.65 TYPE 2 DIABETES MELLITUS WITH HYPERGLYCEMIA, WITHOUT LONG-TERM CURRENT USE OF INSULIN: ICD-10-CM

## 2025-01-22 LAB — POC HEMOGLOBIN A1C: 6 % (ref 4.2–6.5)

## 2025-01-22 PROCEDURE — 83036 HEMOGLOBIN GLYCOSYLATED A1C: CPT | Performed by: PHYSICIAN ASSISTANT

## 2025-01-24 ENCOUNTER — TELEPHONE (OUTPATIENT)
Dept: PRIMARY CARE | Facility: CLINIC | Age: 70
End: 2025-01-24
Payer: MEDICARE

## 2025-01-24 NOTE — TELEPHONE ENCOUNTER
Gisselle called to request that you increase her Ozempic strength.  She said you had talked about it at her last appointment.

## 2025-01-26 DIAGNOSIS — E11.65 TYPE 2 DIABETES MELLITUS WITH HYPERGLYCEMIA, WITHOUT LONG-TERM CURRENT USE OF INSULIN: ICD-10-CM

## 2025-01-27 DIAGNOSIS — E03.9 ACQUIRED HYPOTHYROIDISM: ICD-10-CM

## 2025-01-27 RX ORDER — LEVOTHYROXINE SODIUM 137 UG/1
137 TABLET ORAL DAILY
Qty: 90 TABLET | Refills: 1 | Status: SHIPPED | OUTPATIENT
Start: 2025-01-27

## 2025-02-21 ASSESSMENT — RHEUMATOLOGY NEW PATIENT QUESTIONNAIRE
JOINT PAIN: Y
UNEXPLAINED WEIGHT CHANGE: N
DIFFICULTY FALLING ASLEEP: N
EYE PAIN: N
EXCESSIVE HAIR LOSS (MORE THAN YOUR NORM): N
DOUBLE OR BLURRED VISION: N
NUMBNESS OR TINGLING IN HANDS OR FEET: N
CHEST PAIN: N
SWOLLEN LEGS OR FEET: N
NAUSEA: N
DEPRESSION: N
COLOR CHANGES OF HANDS OR FEET IN THE COLD: N
SWOLLEN OR TENDER GLANDS: N
DIFFICULTY STAYING ASLEEP: N
MEMORY LOSS: N
SKIN TIGHTNESS: N
NIGHT SWEATS: N
MORNING STIFFNESS: Y
NODULES/BUMPS: N
EASILY LOSING TEMPER: N
VOMITING OF BLOOD OR COFFEE GROUND CONSISTENCY MATERIAL: N
BEHAVIORAL CHANGES: N
LOSS OF VISION: N
INCREASED SUSCEPTIBILITY TO INFECTION: N
FEVER: N
HOARSE VOICE: N
UNUSUALLY RAPID OR SLOWED HEART RATE: N
UNUSUAL BLEEDING: N
EYE REDNESS: N
RASH: N
MUSCLE WEAKNESS: N
UNUSUAL FATIGUE: N
HOW WOULD YOU DESCRIBE YOUR STIFFNESS ON AVERAGE: SEVERE
JOINT SWELLING: N
SUN SENSITIVE (SUN ALLERGY): N
ANEMIA: N
STOMACH PAIN: N
PAIN OR BURNING ON URINATION: N
RASH OR ULCERS: N
ABNORMAL URINE: N
DIFFICULTY SWALLOWING: N
SEIZURES: N
EASY BRUISING: N
JAUNDICE: N
AGITATION: N
HEARTBURN OR REFLUX: N
BLOOD IN STOOLS: N
EYE DRYNESS: N
SHORTNESS OF BREATH: N
DIFFICULTY BREATHING LYING DOWN: N
LOSS OF CONSCIOUSNESS: N
FAINTING: N
UNEXPLAINED HEARING LOSS: N
ANXIETY: N
VAGINAL DRYNESS: N
DRYNESS OF MOUTH: N
PERSISTENT DIARRHEA: N
MORNING STIFFNESS IN LOWER BACK: N
SKIN REDNESS: N
BLACK STOOLS: N
HEADACHES: N
SORES IN MOUTH OR NOSE: N
COUGH: N

## 2025-02-27 ENCOUNTER — HOSPITAL ENCOUNTER (OUTPATIENT)
Dept: RADIOLOGY | Facility: HOSPITAL | Age: 70
Discharge: HOME | End: 2025-02-27
Payer: MEDICARE

## 2025-02-27 DIAGNOSIS — Z78.0 ASYMPTOMATIC POSTMENOPAUSAL STATE: ICD-10-CM

## 2025-02-27 PROCEDURE — 77080 DXA BONE DENSITY AXIAL: CPT | Performed by: STUDENT IN AN ORGANIZED HEALTH CARE EDUCATION/TRAINING PROGRAM

## 2025-02-27 PROCEDURE — 77080 DXA BONE DENSITY AXIAL: CPT

## 2025-02-28 ENCOUNTER — HOSPITAL ENCOUNTER (OUTPATIENT)
Dept: RADIOLOGY | Facility: HOSPITAL | Age: 70
Discharge: HOME | End: 2025-02-28
Payer: MEDICARE

## 2025-02-28 ENCOUNTER — APPOINTMENT (OUTPATIENT)
Dept: LAB | Facility: HOSPITAL | Age: 70
End: 2025-02-28
Payer: MEDICARE

## 2025-02-28 ENCOUNTER — APPOINTMENT (OUTPATIENT)
Facility: CLINIC | Age: 70
End: 2025-02-28
Payer: MEDICARE

## 2025-02-28 VITALS
WEIGHT: 233.6 LBS | SYSTOLIC BLOOD PRESSURE: 136 MMHG | OXYGEN SATURATION: 95 % | DIASTOLIC BLOOD PRESSURE: 83 MMHG | BODY MASS INDEX: 35.52 KG/M2 | HEART RATE: 75 BPM

## 2025-02-28 DIAGNOSIS — M25.552 BILATERAL HIP PAIN: ICD-10-CM

## 2025-02-28 DIAGNOSIS — M25.611 STIFFNESS OF JOINT, SHOULDER REGION, RIGHT: ICD-10-CM

## 2025-02-28 DIAGNOSIS — M25.551 BILATERAL HIP PAIN: ICD-10-CM

## 2025-02-28 DIAGNOSIS — E03.9 HYPOTHYROIDISM, UNSPECIFIED TYPE: ICD-10-CM

## 2025-02-28 DIAGNOSIS — M25.511: Primary | ICD-10-CM

## 2025-02-28 DIAGNOSIS — M25.511: ICD-10-CM

## 2025-02-28 DIAGNOSIS — M25.652 HIP JOINT STIFFNESS, BILATERAL: ICD-10-CM

## 2025-02-28 DIAGNOSIS — M25.651 HIP JOINT STIFFNESS, BILATERAL: ICD-10-CM

## 2025-02-28 DIAGNOSIS — M25.512: ICD-10-CM

## 2025-02-28 DIAGNOSIS — M25.612 STIFFNESS OF JOINT, SHOULDER REGION, LEFT: ICD-10-CM

## 2025-02-28 DIAGNOSIS — M85.80 OTHER SPECIFIED DISORDERS OF BONE DENSITY AND STRUCTURE, UNSPECIFIED SITE: ICD-10-CM

## 2025-02-28 PROCEDURE — 73630 X-RAY EXAM OF FOOT: CPT | Mod: 50

## 2025-02-28 PROCEDURE — 1157F ADVNC CARE PLAN IN RCRD: CPT | Performed by: INTERNAL MEDICINE

## 2025-02-28 PROCEDURE — 1036F TOBACCO NON-USER: CPT | Performed by: INTERNAL MEDICINE

## 2025-02-28 PROCEDURE — 84165 PROTEIN E-PHORESIS SERUM: CPT

## 2025-02-28 PROCEDURE — 1125F AMNT PAIN NOTED PAIN PRSNT: CPT | Performed by: INTERNAL MEDICINE

## 2025-02-28 PROCEDURE — 1159F MED LIST DOCD IN RCRD: CPT | Performed by: INTERNAL MEDICINE

## 2025-02-28 PROCEDURE — 3075F SYST BP GE 130 - 139MM HG: CPT | Performed by: INTERNAL MEDICINE

## 2025-02-28 PROCEDURE — 73030 X-RAY EXAM OF SHOULDER: CPT | Mod: 50

## 2025-02-28 PROCEDURE — 3079F DIAST BP 80-89 MM HG: CPT | Performed by: INTERNAL MEDICINE

## 2025-02-28 PROCEDURE — 99205 OFFICE O/P NEW HI 60 MIN: CPT | Performed by: INTERNAL MEDICINE

## 2025-02-28 PROCEDURE — 73521 X-RAY EXAM HIPS BI 2 VIEWS: CPT

## 2025-02-28 ASSESSMENT — ENCOUNTER SYMPTOMS
DEPRESSION: 0
LOSS OF SENSATION IN FEET: 0
OCCASIONAL FEELINGS OF UNSTEADINESS: 0

## 2025-02-28 ASSESSMENT — PATIENT HEALTH QUESTIONNAIRE - PHQ9
1. LITTLE INTEREST OR PLEASURE IN DOING THINGS: NOT AT ALL
SUM OF ALL RESPONSES TO PHQ9 QUESTIONS 1 AND 2: 0
2. FEELING DOWN, DEPRESSED OR HOPELESS: NOT AT ALL

## 2025-02-28 ASSESSMENT — PAIN SCALES - GENERAL: PAINLEVEL_OUTOF10: 7

## 2025-02-28 NOTE — PATIENT INSTRUCTIONS
Start taking calcium 600mg over the counter and vitamin D3 2000 units daily     Prednisone (Deltasone)  Prednisone (Deltasone) is part of a potent class of anti-inflammatory agents, known as corticosteroids, which are used to control inflammation of the joints and organs. It is often used to treat a variety of inflammatory symptoms, including redness, swelling and pain. Prednisone is used to treat conditions like rheumatoid arthritis, lupus, vasculitis, and many other inflammatory diseases.    How To Take It  Dosing of prednisone varies depending on the state of the disease being treated. Doses used in rheumatoid arthritis are commonly 5-10 mg daily, while doses needed in lupus and vasculitis are often 60-80 mg daily, or sometimes higher. The dose is usually decided based on your weight and disease manifestations. Prednisone usually achieves its effect within 1-2 hours. The delayed release tablets take effect about 6 hours after taking the dose. Prednisone stops working soon after stopping the medication. If you have been taking prednisone regularly for longer than 2 weeks, do not stop it suddenly because you could develop adrenal insufficiency. Instead, you should discuss a tapering schedule with your rheumatology provider.    Side Effects  Most side effects are related to the dose and duration, so the goal is to use it at the lowest effective dose for the shortest period necessary. Some potential side effects include easy bruising, osteoporosis (or weakened bones), diabetes, hypertension, weight gain, cataracts, glaucoma, and a bone disorder called avascular necrosis.    Although prednisone rarely has a direct interaction with other medications, there is an increased risk of infection when combining prednisone with other medications that affect your immune system. At higher doses, your provider may also prescribe you prophylactic medications to prevent pneumonia. Additionally, when taking prednisone with NSAIDs  (such as naproxen or ibuprofen), there can be an increased risk of stomach ulcers.    Tell Your Rheumatology Provider  Your doctor will monitor you for side effects. Be sure to discuss any new symptoms you are experiencing with your rheumatology provider.    If pregnant or if you are considering pregnancy, discuss this with your doctor before starting medication. Although prednisone can be necessary to use during pregnancy, complications can include still birth, and premature delivery. Babies born from women receiving large doses of corticosteroids during pregnancy can develop underactive adrenal glands and can be smaller than expected at birth. Babies can develop cleft lip and cleft palate as well. Although some of the drug passes into breast milk, prednisone appears to be safe while breastfeeding. After a dose of 20 mg of prednisone and higher, a 4-hour delay in breastfeeding is recommended.     Polymyalgia Rheumatica  Polymyalgia rheumatica (PMR) is a common condition that involves widespread aching and stiffness. It often affects the upper arms, neck, lower back, and thighs. Pain and stiffness usually are worse in the mornings. PMR doesn’t cause joint swelling, so it can be hard to spot. It may occur along with an autoimmune disease called giant cell arteritis. It’s more common after age 50. Women get PMR slightly more often than men and it’s more common in Caucasians, but anyone can get PMR. PMR’s cause is unknown. It isn’t caused by any medications. There is some inflammation in PMR. Some research suggests that PMR pain may be related to inflamed bursae (sacs) in the shoulders or hips. PMR inflammation responds quickly to treatment.  What Are the Signs/Symptoms?  The most common symptoms of polymyalgia rheumatica are widespread aching and stiff muscles. Symptoms can come on quickly, even overnight. Usually, you feel aches on both sides of your body. It may be hard to raise your arms over your shoulders. Hands  and wrists may ache too. PMR aches may be worse in the morning and get better as the day goes by. Being inactive for a long time, like on a long car ride, may make stiffness worse. Stiffness and aches can be so severe that they cause people to have signs like:  Disturbed sleep  Trouble getting dressed, such as putting on socks  Problems getting in and out of a car or up from a sofa  What Are Common Treatments?  A diagnosis of PMR is hard to confirm. A rheumatologist can do blood tests to look for unusually high markers of inflammation, like erythrocyte sedimentation (“sed”) rate and C-reactive protein. Not everyone with PMR has high levels of these in their blood, but they can help rule out other diseases like rheumatoid arthritis. Low-dose corticosteroids, such as 10-15 mg per day of prednisone, can quickly relieve aching and stiffness. If symptoms improve, patients can take lower doses of prednisone, and then taper off the drug after a year. Some people need to take corticosteroids for 2 to 3 years. Symptoms may recur later. Nonsteroidal anti-inflammatory drugs (NSAIDs), like ibuprofen (Advil, Motrin), or naproxen sodium (Aleve) are not effective for PMR. In patients for whom long-term steroid use is felt to be problematic because of side effects, your rheumatologist might try other medications, including methotrexate of hydroxychloroquine. Recently the Food and Drug Administration also approved the use of a biologic (sarilumab) for this condition.  Living with Polymyalgia Rheumatica  If muscle aches and stiffness respond well to treatment, people with PMR can get back to a normal lifestyle and regular exercise. Even low-dose corticosteroids can cause side effects, so get regular check-ups to watch for these signs:  High blood pressure  Osteoporosis (bone loss)  Weight gain  Cataracts  Sleeplessness  Bruising or thinning of skin  Older patients may need osteoporosis medications to prevent fractures. Because PMR  can occur with giant cell arteritis, let your doctor know if you have headaches, vision changes or fever, which are signs of this disease.

## 2025-02-28 NOTE — PROGRESS NOTES
"Subjective   Patient ID: [id number]   Leona Bourne is a 69 y.o. female who presents for New Patient Visit.    HPI  She first had L shoulder pain 2nd week of January, she was having pain when she would try to move positions at night, or move her shoulder, sometimes even she she is laying down. Waking her up at night.   January 14th she woke up and her R shoulder was painful, L shoulder was more painful, moving through the night would exacerbate the pain. Her R hip and buttock, worse with prolonged sitting, when she is getting out of bed, L big toe started hurting and was swollen and difficulty weight bearing, changed color and it felt like it was gout. L big toe pain significantly imrpoved, but still hurts if she walks on it for too long. R posterior ankle was hurting and almost resolved.   Within a few days L hip started hurting, but not as bad as right side.   Shoulder pain goes up her neck too.     Overhead activities are painful and difficult. Prior to all of this overhead activities were not difficult to do.   Moving and walking is ok after a couple of hours in the AM.     B/l shoulder pain progressively got worse within a week and has been pretty much the same since. R hip has been pretty much the same for the last few weeks.      AM is significantly difficult, can hardly reach to turn on her lamp.   By 6-7 PM all the symptoms are heightened again.     She leads an active lifestyle, goes on trails and since January things are significantly different.     A couple of years ago she dealt with \"inflammation in both her knees\" and saw Dr. Puente for OA and got CSI 2022 and hasn't had any problems since. Effect of injections are still lasting.     She reports that her hypothyroidism is controlled.     ROS  Constitutional: Denies fever, chills, weight loss, night sweats or headaches. + Fatigue out of ordinary, unrefreshed in AM.   Eyes: Denies dry eyes, blurry vision, redness or pain or photophobia  ENT: Denies dry " mouth, dental loss, loss of taste, nasal or oral ulcers, difficulty swallowing, recurrent sinus infections   Cardiovascular: Denies chest pain  Respiratory: Denies shortness of breath, cough, asthma, or recurrent respiratory infections  Gastrointestinal: Denies nausea, vomiting, heartburn, abdominal pain, constipation, diarrhea, melena or hematochezia  Genitourinary: No recurrent urinary infections or STDs, no genital or anal ulcers.  Integumentary: Denies photosensitivity, rash or lesions, Raynaud's phenomenon  Neurological: Denies any numbness or tingling  Hematologic/Lymphatic: Denies history of clots (arterial or venous), or abortions/miscarriages/pregnancy complications   MSK: as above    PMH/PSH: hypothyroid and is on synthroid, HTN, LYNDA  Social: Nonsmoker, no alcohol intake, no drug use. Does part time at [employer], she is an . Has 3 children.   FHx: Brother has gout      Patient Active Problem List   Diagnosis    Benign essential hypertension    Chronic diastolic congestive heart failure    Hashimoto's thyroiditis    Hepatic steatosis    Hypothyroidism    Type 2 diabetes mellitus with hyperglycemia, without long-term current use of insulin    Vitamin D deficiency        Past Medical History:   Diagnosis Date    Achilles tendinitis, right leg 01/13/2020    Achilles tendinitis of right lower extremity    Body mass index (BMI) 37.0-37.9, adult 10/20/2020    Body mass index (BMI) of 37.0 to 37.9 in adult    Morbid (severe) obesity due to excess calories (Multi) 04/14/2021    Class 2 severe obesity due to excess calories with serious comorbidity and body mass index (BMI) of 37.0 to 37.9 in adult    Morbid (severe) obesity due to excess calories (Multi) 04/14/2021    Class 2 severe obesity with serious comorbidity and body mass index (BMI) of 36.0 to 36.9 in adult    Personal history of diseases of the skin and subcutaneous tissue     History of rosacea    Personal history of other diseases of  the circulatory system 04/14/2021    History of congestive heart failure    Personal history of other diseases of the digestive system 03/07/2016    History of esophageal reflux    Personal history of other diseases of the respiratory system 05/28/2015    History of acute sinusitis    Personal history of other malignant neoplasm of skin     Personal history of skin cancer    Personal history of other specified conditions     History of fibrocystic disease of breast    Rectal fistula     Rectal fistula    Right upper quadrant pain 03/07/2016    RUQ abdominal pain    Unspecified symptoms and signs involving the genitourinary system 06/09/2020    UTI symptoms        Past Surgical History:   Procedure Laterality Date    APPENDECTOMY  07/28/2014    Appendectomy    BREAST BIOPSY Left 1985    benign    HYSTERECTOMY  07/28/2014    Hysterectomy    OTHER SURGICAL HISTORY  07/28/2014    Reported Hx Of Breast Surgery For Biopsy    OTHER SURGICAL HISTORY  07/28/2014    Rectal Surgery Repair Of Perirectal Fistula        Social History     Socioeconomic History    Marital status:      Spouse name: Not on file    Number of children: Not on file    Years of education: Not on file    Highest education level: Not on file   Occupational History    Not on file   Tobacco Use    Smoking status: Former     Types: Cigarettes    Smokeless tobacco: Never   Vaping Use    Vaping status: Never Used   Substance and Sexual Activity    Alcohol use: Not Currently    Drug use: Never    Sexual activity: Defer   Other Topics Concern    Not on file   Social History Narrative    Not on file     Social Drivers of Health     Financial Resource Strain: Not on file   Food Insecurity: Not on file   Transportation Needs: Not on file   Physical Activity: Not on file   Stress: Not on file   Social Connections: Not on file   Intimate Partner Violence: Not on file   Housing Stability: Not on file        No Known Allergies       Current Outpatient  Medications:     levothyroxine (Synthroid, Levoxyl) 137 mcg tablet, Take 1 tablet (137 mcg) by mouth once daily., Disp: 90 tablet, Rfl: 1    sacubitriL-valsartan (Entresto) 24-26 mg tablet, Take by mouth twice a day., Disp: , Rfl:     sodium,potassium,mag sulfates (Suprep Bowel Prep Kit) 17.5-3.13-1.6 gram solution, Take one bottle twice as directed by the prep instructions, Disp: 354 mL, Rfl: 0    LORazepam (Ativan) 0.5 mg tablet, Take 1 tablet (0.5 mg) by mouth 2 times a day as needed for anxiety., Disp: 6 tablet, Rfl: 0    semaglutide (OZEMPIC) 1 mg/dose (4 mg/3 mL) pen injector, Inject 1 mg under the skin 1 (one) time per week., Disp: 3 mL, Rfl: 2       Objective     Visit Vitals  /83 (BP Location: Left arm, Patient Position: Sitting)   Pulse 75      Physical Exam  General: AAOx3, Cooperative  Head: normocephalic, atraumatic  Eyes: EOMI, conjunctiva clear, sclera white, anicteric  Throat/Mouth: No oral deformities, no cheek swelling, mucosa with decreased salivary pooling, no oral ulcers noted or loss of dentition   Neck/Lymph: FROM, trachea midline  Neuro: CN II-XII grossly intact, no focal deficit  Skin: No rashes, ulcers or photosensitive areas  MSK: Upper Extremities:  Hand/Fingers: No erythema, swelling, tenderness or warmth at DIP, PIP, or MCP joints, FROM grossly. Good hand . No nodules. No deformities   Wrists: No erythema, swelling, warmth or tenderness at wrist, FROM grossly  Elbows: No tenderness, swelling, erythema or warmth at elbows, FROM grossly. No nodules   Shoulders: Pain with Active abd of shoulders, starts at around 20 degrees, pain with Active FF starts at around 60 degrees upward. PROM>AROM.   Lower Extremities:   Hips: Pain and limitation with FADER L>R.   Knees: L knee with mild swelling but cool, no tenderness, deformities, normal ROM grossly. + crepitus.  Ankles: No deformities, tenderness, edema, erythema, ulceration, or warmth at the ankle  Feet: L 1st MTP with ttp, but  "without synovitis.        Lab Results   Component Value Date    WBC 6.5 07/22/2024    HGB 14.6 07/22/2024    HCT 44.1 07/22/2024    MCV 90 07/22/2024     07/22/2024        Chemistry    Lab Results   Component Value Date/Time     08/19/2024 1310    K 4.0 08/19/2024 1310     08/19/2024 1310    CO2 27 08/19/2024 1310    BUN 16 08/19/2024 1310    CREATININE 0.93 08/19/2024 1310    Lab Results   Component Value Date/Time    CALCIUM 9.5 08/19/2024 1310    ALKPHOS 84 08/19/2024 1310    AST 14 08/19/2024 1310    ALT 13 08/19/2024 1310    BILITOT 0.7 08/19/2024 1310           No results found for: \"CRP\"   Lab Results   Component Value Date    ISABELLA POSITIVE (A) 03/18/2022      No results found for: \"CKTOTAL\"  Lab Results   Component Value Date    NEUTROABS 4.17 07/22/2024      Lab Results   Component Value Date    FERRITIN 205 (H) 03/18/2022      Lab Results   Component Value Date    HEPAIGM NONREACTIVE 03/18/2022    HEPBCIGM NONREACTIVE 03/18/2022    HEPCAB NONREACTIVE 03/18/2022      Lab Results   Component Value Date    ALT 13 08/19/2024    AST 14 08/19/2024    GGT 24 03/18/2022    ALKPHOS 84 08/19/2024    BILITOT 0.7 08/19/2024      No results found for: \"PPD\"   No results found for: \"URICACID\"   Lab Results   Component Value Date    CALCIUM 9.5 08/19/2024      No results found for: \"SPEP\", \"UPEP\"   No results found for: \"ALBUR\", \"[id number]\"     BI mammo bilateral screening tomosynthesis  Narrative: Interpreted By:  Cas Schmitt,   STUDY:  BI MAMMO BILATERAL SCREENING TOMOSYNTHESIS;  5/2/2024 10:24 am      ACCESSION NUMBER(S):  [id number]      ORDERING CLINICIAN:  PABLITO LEONARD      INDICATION:  Screening. History of a benign left breast biopsy decades ago and a  maternal aunt with breast cancer.      COMPARISON:  01/09/2023 and 07/27/2021 bilateral screening mammograms with  tomosynthesis.      FINDINGS:  2D and tomosynthesis images were reviewed at 1 mm slice thickness.      Density:  The " breast tissue is heterogeneously dense, which may  obscure small masses.      No suspicious masses or calcifications are identified.      Impression: No mammographic evidence of malignancy.      BI-RADS CATEGORY:      BI-RADS Category:  1 Negative.  Recommendation:  Annual Screening.  Recommended Date:  1 Year.  Laterality:  Bilateral.      For any future breast imaging appointments, please call [phone]  ([phone]).              Based on the Tyrer-Cuzick model for breast cancer risk assessment,  the patient's lifetime risk of breast cancer is 6.9%. Patients with  over a 20% lifetime risk of developing breast cancer may benefit from  additional screening with breast MRI or ultrasound. Please note that  this estimate is based on responses provided on the patient  questionnaire. For more information regarding high risk consultation,  please call [phone].      MACRO:  None      Signed by: Cas Schmitt 5/2/2024 5:41 PM  Dictation workstation:   [id number]     === 07/15/22 ===    KNEE    - Impression -  1. Mild bilateral knee joint degenerative changes with no significant  interval change.      === 11/10/21 ===    DEXA BONE DENSITY AXIAL SKELETON W VFA     Assessment/Plan    A 69 year old F here with known hypothyroidism here for new onset b/l shoulder and pelvic girdle pain and stiffness.     Impression: presentation is typical for PMR  Need to rule out RA   Age>60  Symmetric B/l shoulder discomfort with stiffness, hip and neck pain.   No headaches, jaw or tongue claudication, visual disturbance, scalp tenderness  No high grade fevers   Typical physical exam.     Plan:  - Labs today to evaluate for inflammation, RF/ACPA  - Check TSH  - XR hands, shoulders, feet  - Plan to start prednisone after labs  - Check vitamin D levels and phos, PTH  - She recently had DEXA, results not in the system yet.     She had an acute short lived episode of L 1st MTP pain and swelling and difficulty weightbearing, very  suggestive of gout episode.   - This is the first episode so need for ULT unless labs show otherwise or recurrence  - Uric acid levels     RTC in 2 weeks      Amina Kinsey MD  Division of Rheumatology   [facility]

## 2025-03-01 LAB — PROT SERPL-MCNC: 7.9 G/DL (ref 6.4–8.2)

## 2025-03-02 LAB
25(OH)D3+25(OH)D2 SERPL-MCNC: 32 NG/ML (ref 30–100)
ALBUMIN SERPL-MCNC: 4.6 G/DL (ref 3.6–5.1)
ALP SERPL-CCNC: 101 U/L (ref 37–153)
ALT SERPL-CCNC: 10 U/L (ref 6–29)
ANA SER QL IF: NORMAL
ANION GAP SERPL CALCULATED.4IONS-SCNC: 10 MMOL/L (CALC) (ref 7–17)
AST SERPL-CCNC: 15 U/L (ref 10–35)
BASOPHILS # BLD AUTO: 91 CELLS/UL (ref 0–200)
BASOPHILS NFR BLD AUTO: 1.3 %
BILIRUB SERPL-MCNC: 1.3 MG/DL (ref 0.2–1.2)
BUN SERPL-MCNC: 19 MG/DL (ref 7–25)
CALCIUM SERPL-MCNC: 10 MG/DL (ref 8.6–10.4)
CCP IGG SERPL-ACNC: NORMAL
CHLORIDE SERPL-SCNC: 99 MMOL/L (ref 98–110)
CO2 SERPL-SCNC: 28 MMOL/L (ref 20–32)
CREAT SERPL-MCNC: 0.88 MG/DL (ref 0.6–1)
CRP SERPL-MCNC: NORMAL MG/L
EGFRCR SERPLBLD CKD-EPI 2021: 71 ML/MIN/1.73M2
EOSINOPHIL # BLD AUTO: 133 CELLS/UL (ref 15–500)
EOSINOPHIL NFR BLD AUTO: 1.9 %
ERYTHROCYTE [DISTWIDTH] IN BLOOD BY AUTOMATED COUNT: 13 % (ref 11–15)
ERYTHROCYTE [SEDIMENTATION RATE] IN BLOOD BY WESTERGREN METHOD: 53 MM/H
GLUCOSE SERPL-MCNC: 100 MG/DL (ref 65–99)
HCT VFR BLD AUTO: 47.1 % (ref 35–45)
HGB BLD-MCNC: 15.5 G/DL (ref 11.7–15.5)
LYMPHOCYTES # BLD AUTO: 2016 CELLS/UL (ref 850–3900)
LYMPHOCYTES NFR BLD AUTO: 28.8 %
MCH RBC QN AUTO: 29.6 PG (ref 27–33)
MCHC RBC AUTO-ENTMCNC: 32.9 G/DL (ref 32–36)
MCV RBC AUTO: 90.1 FL (ref 80–100)
MONOCYTES # BLD AUTO: 420 CELLS/UL (ref 200–950)
MONOCYTES NFR BLD AUTO: 6 %
NEUTROPHILS # BLD AUTO: 4340 CELLS/UL (ref 1500–7800)
NEUTROPHILS NFR BLD AUTO: 62 %
PHOSPHATE SERPL-MCNC: 3.5 MG/DL (ref 2.1–4.3)
PLATELET # BLD AUTO: 407 THOUSAND/UL (ref 140–400)
PMV BLD REES-ECKER: 10.3 FL (ref 7.5–12.5)
POTASSIUM SERPL-SCNC: 4.3 MMOL/L (ref 3.5–5.3)
PROT SERPL-MCNC: 7.7 G/DL (ref 6.1–8.1)
PTH-INTACT SERPL-MCNC: 46 PG/ML (ref 16–77)
RBC # BLD AUTO: 5.23 MILLION/UL (ref 3.8–5.1)
RHEUMATOID FACT SERPL-ACNC: NORMAL [IU]/ML
SODIUM SERPL-SCNC: 137 MMOL/L (ref 135–146)
TSH SERPL-ACNC: 6.31 MIU/L (ref 0.4–4.5)
URATE SERPL-MCNC: 6.1 MG/DL (ref 2.5–7)
WBC # BLD AUTO: 7 THOUSAND/UL (ref 3.8–10.8)

## 2025-03-03 DIAGNOSIS — M35.3 PMR (POLYMYALGIA RHEUMATICA) (MULTI): Primary | ICD-10-CM

## 2025-03-03 LAB
ALBUMIN: 4.4 G/DL (ref 3.4–5)
ALPHA 1 GLOBULIN: 0.3 G/DL (ref 0.2–0.6)
ALPHA 2 GLOBULIN: 0.9 G/DL (ref 0.4–1.1)
BETA GLOBULIN: 1.2 G/DL (ref 0.5–1.2)
GAMMA GLOBULIN: 1.2 G/DL (ref 0.5–1.4)
PATH REVIEW-SERUM PROTEIN ELECTROPHORESIS: NORMAL
PROTEIN ELECTROPHORESIS COMMENT: NORMAL

## 2025-03-03 RX ORDER — PREDNISONE 10 MG/1
20 TABLET ORAL DAILY
Qty: 28 TABLET | Refills: 0 | Status: SHIPPED | OUTPATIENT
Start: 2025-03-03 | End: 2025-03-17

## 2025-03-04 ENCOUNTER — TELEPHONE (OUTPATIENT)
Dept: RHEUMATOLOGY | Facility: CLINIC | Age: 70
End: 2025-03-04
Payer: MEDICARE

## 2025-03-04 LAB
25(OH)D3+25(OH)D2 SERPL-MCNC: 32 NG/ML (ref 30–100)
ALBUMIN SERPL-MCNC: 4.6 G/DL (ref 3.6–5.1)
ALP SERPL-CCNC: 101 U/L (ref 37–153)
ALT SERPL-CCNC: 10 U/L (ref 6–29)
ANA PAT SER IF-IMP: ABNORMAL
ANA SER QL IF: POSITIVE
ANA TITR SER IF: ABNORMAL TITER
ANION GAP SERPL CALCULATED.4IONS-SCNC: 10 MMOL/L (CALC) (ref 7–17)
AST SERPL-CCNC: 15 U/L (ref 10–35)
BASOPHILS # BLD AUTO: 91 CELLS/UL (ref 0–200)
BASOPHILS NFR BLD AUTO: 1.3 %
BILIRUB SERPL-MCNC: 1.3 MG/DL (ref 0.2–1.2)
BUN SERPL-MCNC: 19 MG/DL (ref 7–25)
CALCIUM SERPL-MCNC: 10 MG/DL (ref 8.6–10.4)
CCP IGG SERPL-ACNC: <16 UNITS
CENTROMERE B AB SER-ACNC: ABNORMAL AI
CHLORIDE SERPL-SCNC: 99 MMOL/L (ref 98–110)
CO2 SERPL-SCNC: 28 MMOL/L (ref 20–32)
CREAT SERPL-MCNC: 0.88 MG/DL (ref 0.6–1)
CRP SERPL-MCNC: 15.6 MG/L
DSDNA AB SER-ACNC: 1 IU/ML
EGFRCR SERPLBLD CKD-EPI 2021: 71 ML/MIN/1.73M2
ENA JO1 AB SER IA-ACNC: ABNORMAL AI
ENA RNP AB SER-ACNC: ABNORMAL AI
ENA SCL70 AB SER IA-ACNC: ABNORMAL AI
ENA SM AB SER IA-ACNC: ABNORMAL AI
ENA SM+RNP AB SER IA-ACNC: ABNORMAL AI
ENA SS-A AB SER IA-ACNC: ABNORMAL AI
ENA SS-B AB SER IA-ACNC: ABNORMAL AI
EOSINOPHIL # BLD AUTO: 133 CELLS/UL (ref 15–500)
EOSINOPHIL NFR BLD AUTO: 1.9 %
ERYTHROCYTE [DISTWIDTH] IN BLOOD BY AUTOMATED COUNT: 13 % (ref 11–15)
ERYTHROCYTE [SEDIMENTATION RATE] IN BLOOD BY WESTERGREN METHOD: 53 MM/H
GLUCOSE SERPL-MCNC: 100 MG/DL (ref 65–99)
HCT VFR BLD AUTO: 47.1 % (ref 35–45)
HGB BLD-MCNC: 15.5 G/DL (ref 11.7–15.5)
LABORATORY COMMENT REPORT: ABNORMAL
LYMPHOCYTES # BLD AUTO: 2016 CELLS/UL (ref 850–3900)
LYMPHOCYTES NFR BLD AUTO: 28.8 %
MCH RBC QN AUTO: 29.6 PG (ref 27–33)
MCHC RBC AUTO-ENTMCNC: 32.9 G/DL (ref 32–36)
MCV RBC AUTO: 90.1 FL (ref 80–100)
MONOCYTES # BLD AUTO: 420 CELLS/UL (ref 200–950)
MONOCYTES NFR BLD AUTO: 6 %
NEUTROPHILS # BLD AUTO: 4340 CELLS/UL (ref 1500–7800)
NEUTROPHILS NFR BLD AUTO: 62 %
NUCLEOSOME AB SER IA-ACNC: ABNORMAL AI
PHOSPHATE SERPL-MCNC: 3.5 MG/DL (ref 2.1–4.3)
PLATELET # BLD AUTO: 407 THOUSAND/UL (ref 140–400)
PMV BLD REES-ECKER: 10.3 FL (ref 7.5–12.5)
POTASSIUM SERPL-SCNC: 4.3 MMOL/L (ref 3.5–5.3)
PROT SERPL-MCNC: 7.7 G/DL (ref 6.1–8.1)
PTH-INTACT SERPL-MCNC: 46 PG/ML (ref 16–77)
RBC # BLD AUTO: 5.23 MILLION/UL (ref 3.8–5.1)
RHEUMATOID FACT SERPL-ACNC: <10 IU/ML
RIBOSOMAL P AB SER-ACNC: ABNORMAL AI
SODIUM SERPL-SCNC: 137 MMOL/L (ref 135–146)
T3FREE SERPL-MCNC: 2.5 PG/ML (ref 2.3–4.2)
TSH SERPL-ACNC: 6.31 MIU/L (ref 0.4–4.5)
URATE SERPL-MCNC: 6.1 MG/DL (ref 2.5–7)
WBC # BLD AUTO: 7 THOUSAND/UL (ref 3.8–10.8)

## 2025-03-04 NOTE — TELEPHONE ENCOUNTER
I called to let the pt know that doctor had prescribed Prednisone 20 mg daily and wants her to take vitamin D and calcium

## 2025-03-10 ENCOUNTER — APPOINTMENT (OUTPATIENT)
Dept: PREADMISSION TESTING | Facility: HOSPITAL | Age: 70
End: 2025-03-10
Payer: MEDICARE

## 2025-03-14 ENCOUNTER — APPOINTMENT (OUTPATIENT)
Facility: CLINIC | Age: 70
End: 2025-03-14
Payer: MEDICARE

## 2025-03-14 VITALS
HEART RATE: 76 BPM | WEIGHT: 233.8 LBS | DIASTOLIC BLOOD PRESSURE: 77 MMHG | BODY MASS INDEX: 35.55 KG/M2 | SYSTOLIC BLOOD PRESSURE: 126 MMHG | OXYGEN SATURATION: 97 %

## 2025-03-14 DIAGNOSIS — M25.511: ICD-10-CM

## 2025-03-14 DIAGNOSIS — Z79.899 HIGH RISK MEDICATION USE: ICD-10-CM

## 2025-03-14 DIAGNOSIS — M81.0 AGE-RELATED OSTEOPOROSIS WITHOUT CURRENT PATHOLOGICAL FRACTURE: ICD-10-CM

## 2025-03-14 DIAGNOSIS — M25.512: ICD-10-CM

## 2025-03-14 DIAGNOSIS — M35.3 POLYMYALGIA RHEUMATICA (MULTI): Primary | ICD-10-CM

## 2025-03-14 PROCEDURE — 3074F SYST BP LT 130 MM HG: CPT | Performed by: INTERNAL MEDICINE

## 2025-03-14 PROCEDURE — 3078F DIAST BP <80 MM HG: CPT | Performed by: INTERNAL MEDICINE

## 2025-03-14 PROCEDURE — 1159F MED LIST DOCD IN RCRD: CPT | Performed by: INTERNAL MEDICINE

## 2025-03-14 PROCEDURE — 1157F ADVNC CARE PLAN IN RCRD: CPT | Performed by: INTERNAL MEDICINE

## 2025-03-14 PROCEDURE — 1036F TOBACCO NON-USER: CPT | Performed by: INTERNAL MEDICINE

## 2025-03-14 PROCEDURE — 99215 OFFICE O/P EST HI 40 MIN: CPT | Performed by: INTERNAL MEDICINE

## 2025-03-14 RX ORDER — PREDNISONE 5 MG/1
TABLET ORAL
Qty: 91 TABLET | Refills: 0 | Status: SHIPPED | OUTPATIENT
Start: 2025-03-18 | End: 2025-04-15

## 2025-03-14 RX ORDER — KETOCONAZOLE 20 MG/G
CREAM TOPICAL
COMMUNITY
Start: 2025-02-03

## 2025-03-14 RX ORDER — ALENDRONATE SODIUM 70 MG/1
70 TABLET ORAL
Qty: 12 TABLET | Refills: 0 | Status: SHIPPED | OUTPATIENT
Start: 2025-03-14 | End: 2025-06-12

## 2025-03-14 ASSESSMENT — ENCOUNTER SYMPTOMS
LOSS OF SENSATION IN FEET: 0
OCCASIONAL FEELINGS OF UNSTEADINESS: 0
DEPRESSION: 0

## 2025-03-14 NOTE — PROGRESS NOTES
"Subjective   Patient ID: [id number]  Leona Bourne is a 69 y.o. female who presents for Follow-up.  HPI  PMH/PSH: hypothyroid and is on synthroid, HTN, LYNDA  Social: Nonsmoker, no alcohol intake, no drug use. Does part time at [employer], she is an . Has 3 children.   FHx: Brother has gout    First visit 2/28/2025:  She first had L shoulder pain 2nd week of January 2025, she was having pain when she would try to move positions at night, or move her shoulder, sometimes even she she is laying down. Waking her up at night.   January 14th she woke up and her R shoulder was painful, L shoulder was more painful, moving through the night would exacerbate the pain. Her R hip and buttock, worse with prolonged sitting, when she is getting out of bed, L big toe started hurting and was swollen and difficulty weight bearing, changed color and it felt like it was gout. L big toe pain significantly imrpoved, but still hurts if she walks on it for too long. R posterior ankle was hurting and almost resolved.   Within a few days L hip started hurting, but not as bad as right side.   Shoulder pain goes up her neck too.      Overhead activities are painful and difficult. Prior to all of this overhead activities were not difficult to do.   Moving and walking is ok after a couple of hours in the AM.      B/l shoulder pain progressively got worse within a week and has been pretty much the same since. R hip has been pretty much the same for the last few weeks.      AM is significantly difficult, can hardly reach to turn on her lamp.   By 6-7 PM all the symptoms are heightened again.      She leads an active lifestyle, goes on trails and since January things are significantly different.      A couple of years ago she dealt with \"inflammation in both her knees\" and saw Dr. Puente for OA and got CSI 2022 and hasn't had any problems since. Effect of injections are still lasting.      She reports that her hypothyroidism is " controlled.    Interval Hx:  2/28 labs with APR   3/3 I started her on prednisone 20mg  She feels 100% improved on prednisone, tolerating it well   No longer difficult with overhead activities, no shoulder pain or hip pain, can cross her legs now. Fatigue has improved    History of falls / fractures: no  Loss of height: no  Tobacco: no  Alcohol: no  Vitamin D supplementation: yes  Calcium supplementation: yes  Weight bearing exercise: yes  Previous or planned invasive jaw surgery: no  History of radiation: no  Chronic steroid use: yes  History of RA: no  GERD: no  Katarina-en-y: no  CKD / GFR <30: no  Parental hip fracture: no     Denies fever, chills, weight loss, night sweats or headaches. Denies dry eyes, blurry vision, redness or pain or photophobia. Denies dry mouth, dental loss, loss of taste, nasal or oral ulcers, difficulty swallowing, recurrent sinus infections. Denies chest pain. Denies shortness of breath, cough, asthma, or recurrent respiratory infections. Denies nausea, vomiting, heartburn, abdominal pain, constipation, diarrhea, melena or hematochezia. No recurrent urinary infections or STDs, no genital or anal ulcers. Denies photosensitivity, rash or lesions, Raynaud's phenomenon. Denies any numbness or tingling. Denies history of clots (arterial or venous), or abortions/miscarriages/pregnancy complications     Patient Active Problem List   Diagnosis    Benign essential hypertension    Chronic diastolic congestive heart failure    Hashimoto's thyroiditis    Hepatic steatosis    Hypothyroidism    Type 2 diabetes mellitus with hyperglycemia, without long-term current use of insulin    Vitamin D deficiency       Past Medical History:   Diagnosis Date    Achilles tendinitis, right leg 01/13/2020    Achilles tendinitis of right lower extremity    Body mass index (BMI) 37.0-37.9, adult 10/20/2020    Body mass index (BMI) of 37.0 to 37.9 in adult    Morbid (severe) obesity due to excess calories (Multi)  04/14/2021    Class 2 severe obesity due to excess calories with serious comorbidity and body mass index (BMI) of 37.0 to 37.9 in adult    Morbid (severe) obesity due to excess calories (Multi) 04/14/2021    Class 2 severe obesity with serious comorbidity and body mass index (BMI) of 36.0 to 36.9 in adult    Personal history of diseases of the skin and subcutaneous tissue     History of rosacea    Personal history of other diseases of the circulatory system 04/14/2021    History of congestive heart failure    Personal history of other diseases of the digestive system 03/07/2016    History of esophageal reflux    Personal history of other diseases of the respiratory system 05/28/2015    History of acute sinusitis    Personal history of other malignant neoplasm of skin     Personal history of skin cancer    Personal history of other specified conditions     History of fibrocystic disease of breast    Rectal fistula     Rectal fistula    Right upper quadrant pain 03/07/2016    RUQ abdominal pain    Unspecified symptoms and signs involving the genitourinary system 06/09/2020    UTI symptoms       Past Surgical History:   Procedure Laterality Date    APPENDECTOMY  07/28/2014    Appendectomy    BREAST BIOPSY Left 1985    benign    HYSTERECTOMY  07/28/2014    Hysterectomy    OTHER SURGICAL HISTORY  07/28/2014    Reported Hx Of Breast Surgery For Biopsy    OTHER SURGICAL HISTORY  07/28/2014    Rectal Surgery Repair Of Perirectal Fistula       Social History     Socioeconomic History    Marital status:      Spouse name: Not on file    Number of children: Not on file    Years of education: Not on file    Highest education level: Not on file   Occupational History    Not on file   Tobacco Use    Smoking status: Former     Types: Cigarettes    Smokeless tobacco: Never   Vaping Use    Vaping status: Never Used   Substance and Sexual Activity    Alcohol use: Not Currently    Drug use: Never    Sexual activity: Defer    Other Topics Concern    Not on file   Social History Narrative    Not on file     Social Drivers of Health     Financial Resource Strain: Not on file   Food Insecurity: Not on file   Transportation Needs: Not on file   Physical Activity: Not on file   Stress: Not on file   Social Connections: Not on file   Intimate Partner Violence: Not on file   Housing Stability: Not on file       No Known Allergies      Current Outpatient Medications:     ketoconazole (NIZOral) 2 % cream, Apply topically., Disp: , Rfl:     levothyroxine (Synthroid, Levoxyl) 137 mcg tablet, Take 1 tablet (137 mcg) by mouth once daily., Disp: 90 tablet, Rfl: 1    predniSONE (Deltasone) 10 mg tablet, Take 2 tablets (20 mg) by mouth once daily for 14 days., Disp: 28 tablet, Rfl: 0    sacubitriL-valsartan (Entresto) 24-26 mg tablet, Take by mouth twice a day., Disp: , Rfl:     sodium,potassium,mag sulfates (Suprep Bowel Prep Kit) 17.5-3.13-1.6 gram solution, Take one bottle twice as directed by the prep instructions, Disp: 354 mL, Rfl: 0    Objective     Visit Vitals  /77 (BP Location: Left arm, Patient Position: Sitting)   Pulse 76       Physical Exam  Copied from previous exam unless annotated below   General: AAOx3, Cooperative  Head: normocephalic, atraumatic  Eyes: EOMI, conjunctiva clear, sclera white, anicteric  Throat/Mouth: No oral deformities, no cheek swelling, mucosa with decreased salivary pooling, no oral ulcers noted or loss of dentition   Neck/Lymph: FROM, trachea midline  Neuro: CN II-XII grossly intact, no focal deficit  Skin: No rashes, ulcers or photosensitive areas  MSK: Upper Extremities:  Hand/Fingers: No erythema, swelling, tenderness or warmth at DIP, PIP, or MCP joints, FROM grossly. Good hand . No nodules. No deformities   Wrists: No erythema, swelling, warmth or tenderness at wrist, FROM grossly  Elbows: No tenderness, swelling, erythema or warmth at elbows, FROM grossly. No nodules   Shoulders: FROM  Lower  Extremities:   Hips: FROM   Knees: L knee with mild swelling but cool, no tenderness, deformities, normal ROM grossly. + crepitus.  Ankles: No deformities, tenderness, edema, erythema, ulceration, or warmth at the ankle     Lab Results   Component Value Date    WBC 7.0 02/28/2025    HGB 15.5 02/28/2025    HCT 47.1 (H) 02/28/2025    MCV 90.1 02/28/2025     (H) 02/28/2025       Chemistry    Lab Results   Component Value Date/Time     02/28/2025 1035    K 4.3 02/28/2025 1035    CL 99 02/28/2025 1035    CO2 28 02/28/2025 1035    BUN 19 02/28/2025 1035    CREATININE 0.88 02/28/2025 1035    Lab Results   Component Value Date/Time    CALCIUM 10.0 02/28/2025 1035    ALKPHOS 101 02/28/2025 1035    AST 15 02/28/2025 1035    ALT 10 02/28/2025 1035    BILITOT 1.3 (H) 02/28/2025 1035          Lab Results   Component Value Date    CRP 15.6 (H) 02/28/2025    ISABELLA POSITIVE (A) 02/28/2025    JO1 <1.0 NEG 02/28/2025    SPEP Normal. 02/28/2025    PTH 46 02/28/2025    CALCIUM 10.0 02/28/2025    PHOS 3.5 02/28/2025    FERRITIN 205 (H) 03/18/2022     ALKALINE PHOSPHATASE   Date Value Ref Range Status   02/28/2025 101 37 - 153 U/L Final     AST   Date Value Ref Range Status   02/28/2025 15 10 - 35 U/L Final     UREA NITROGEN (BUN)   Date Value Ref Range Status   02/28/2025 19 7 - 25 mg/dL Final     SODIUM   Date Value Ref Range Status   02/28/2025 137 135 - 146 mmol/L Final     POTASSIUM   Date Value Ref Range Status   02/28/2025 4.3 3.5 - 5.3 mmol/L Final     CARBON DIOXIDE   Date Value Ref Range Status   02/28/2025 28 20 - 32 mmol/L Final     ALT   Date Value Ref Range Status   02/28/2025 10 6 - 29 U/L Final     VITAMIN D,25-OH,TOTAL,IA   Date Value Ref Range Status   02/28/2025 32 30 - 100 ng/mL Final     Comment:     Vitamin D Status         25-OH Vitamin D:     Deficiency:                    <20 ng/mL  Insufficiency:             20 - 29 ng/mL  Optimal:                 > or = 30 ng/mL     For 25-OH Vitamin D testing on  patients on   D2-supplementation and patients for whom quantitation   of D2 and D3 fractions is required, the QuestAssureD(TM)  25-OH VIT D, (D2,D3), LC/MS/MS is recommended: order   code 60097 (patients >2yrs).     See Note 1     Note 1     For additional information, please refer to   http://education.[employer]/faq/XSU355   (This link is being provided for informational/  educational purposes only.)         XR foot 3+ views bilateral  Narrative: Interpreted By:  Austin Miramontes,   STUDY:  XR FOOT 3+ VIEWS BILATERAL  2/28/2025 11:14 am      INDICATION:  Signs/Symptoms:evaluate for inflammatory arthritis, gout changes, OA      COMPARISON:  None.      ACCESSION NUMBER(S):  [id number]      ORDERING CLINICIAN:  ARUNA JONES      TECHNIQUE:  Three views each of the bilateral feet including AP , oblique and  lateral projections were obtained.      FINDINGS:  There is no radiographic evidence of acute fracture or dislocation  identified. Mild degenerative changes are seen in the 1st through 3rd  tarsometatarsal articulations bilaterally. Calcaneal enthesophytes  are seen bilaterally, larger on the right than on the left.      Impression: 1. No acute fracture or dislocation identified.  2. Degenerative changes, as described above.      MACRO:  None.      Signed by: Austin Miramontes 3/1/2025 10:19 AM  Dictation workstation:   [id number]  XR hips bilateral 2 VW w pelvis when performed  Narrative: Interpreted By:  Austin Miramontes,   STUDY:  XR HIPS BILATERAL 2 VW WITH PELVIS WHEN PERFORMED 2/28/2025 11:14 am      INDICATION:  Signs/Symptoms:evaluate for inflammatory arthritis, OA      COMPARISON:  None.      ACCESSION NUMBER(S):  [id number]      ORDERING CLINICIAN:  ARUNA JONES      TECHNIQUE:  AP and lateral views each of the bilateral hips were obtained.      FINDINGS:  There is no evidence of acute fracture or dislocation identified.  Mild hypertrophic degenerative changes are seen in the sacroiliac  joints  bilaterally. Minimal joint space narrowing and tiny marginal  osteophytes are seen in the hips bilaterally, greater on the left  than on the right. Rounded calcifications are seen with throughout  the pelvis, most consistent with phleboliths.      Impression: 1.  No fracture or dislocation.  2. Degenerative changes, as described above.      MACRO:  None.      Signed by: Austin Miramontes 3/1/2025 10:18 AM  Dictation workstation:   [id number]  XR shoulder 2+ views bilateral  Narrative: Interpreted By:  Austin Miramontes,   STUDY:  XR SHOULDER 2+ VIEWS BILATERAL 2/28/2025 11:14 am      INDICATION:  Signs/Symptoms:evaluate for inflammatory arthritis, OA      COMPARISON:  None.      ACCESSION NUMBER(S):  [id number]      ORDERING CLINICIAN:  ARUNA JONES      TECHNIQUE:  Three views each of the bilateral shoulders including AP , axillary  and scapular Y-views were obtained.      FINDINGS:  There is no radiographic evidence of acute fracture or dislocation  identified.  Minimal hypertrophic degenerative changes are seen in  the acromioclavicular joints bilaterally. The glenohumeral joints are  well preserved.      Impression: 1. No evidence of acute fracture or dislocation.      MACRO:  None.      Signed by: Austin Miramontes 3/1/2025 10:17 AM  Dictation workstation:   [id number]    DEXA 2/27/2025  FINDINGS:  SPINE L1-L4  Bone Mineral Density: 1.118  T-Score 0.6  Z-Score 2.8  Classification:  Normal  Bone Mineral Density change vs baseline:  Not reported  Bone Mineral Density change vs previous: Not reported      LEFT FEMUR -TOTAL  Bone Mineral Density: 1.116  T-Score 1.4   Z-Score  3  Classification:  Normal  Bone Mineral Density change vs baseline: -4.7  Bone Mineral Density change vs previous: -2.2      LEFT FEMUR -NECK  Bone Mineral Density: 0.932  T-Score 0.8  Z-Score 2.6  Classification:  Normal          Assessment/Plan   known hypothyroidism here for new onset b/l shoulder and pelvic girdle pain and stiffness.       Impression: PMR, classical presentation and physical exam  Need to rule out RA   Age>60  Symmetric B/l shoulder discomfort with stiffness, hip and neck pain.   No headaches, jaw or tongue claudication, visual disturbance, scalp tenderness  No high grade fevers     Labs 2/28 reviewed:  CBC with thrombocytosis, normal ALP, ESR 53 and CRP 1.5mg/dL   SPEP normal   RF, CCP neg  Vitamin D and phos normal   ISABELLA 1:80, ALIA neg  TSH elevated but normal T3  Uric acid 6.1    XR 2/28/2025 reviewed  Shoulder: AC OA   Hips: OA of SI b/l, OA of hips L>R  Feet: OA changes 1-3 TMT b/l, calcaneal enthesophytes b/l R>L     - Labs today to re-evaluate inflammatory markers after prednisone   - Continue prednisone 20mg for total of two weeks then 17.5mg for two weeks then 15mg for two weeks    DEXA reviewed from 2/27/2025  Adjusted FRAX: 10% for mop, 0.36% for hip   Since she is 10% risk with adjusted FRAX, she is medium risk and since she is on prednisone, will start fosamax   - start fosamax, counseled on risks/benefits  - continue calcium and vitamin D      She had an acute short lived episode of L 1st MTP pain and swelling and difficulty weightbearing, very suggestive of gout episode.  Uric acid 6.1mg/dL  - This is the first episode so need for ULT unless labs show otherwise or recurrence     RTC in 1 month       Amina Kinsey MD  Division of Rheumatology   [facility]

## 2025-03-14 NOTE — PATIENT INSTRUCTIONS
Bisphosphonate Therapy  Bisphosphonates are a group of medicines used to treat osteopenia or osteoporosis, which are conditions associated with thin or fragile bones that are at increased risk for fracture. The medications help strengthen the bones and prevent future bone fractures. Patients with low bone density or a history of low-energy trauma fractures are recommended to take these medications. Disorders such as Paget’s disease and cancer that have spread (metastasized) to the bone are other indications for use. Patients on long-term oral steroid medications may also be recommended to take these medications.    Bisphosphonate medications include alendronate (Fosamax), risedronate (Actonel), and ibandronate (Boniva). Pamidronate, ibandronate, and zoledronic acid (Reclast/Zometa) are options to be administered through your vein. Bone cells in our bodies are constantly being slowly removed and replaced with new bone cells. As we age and in certain diseases, the bone is removed or damaged faster than your body can replace it, which leads to thin and weakened bones. Bisphosphonates work by reducing the turnover of bone which lowers the risk of fracture.    How To Take It  Alendronate, risedronate, and ibandronate are oral medications for osteoporosis treatment. Alendronate is given 70mg once a week. Risedronate is given 35 mg weekly or 150 mg monthly. Ibandronate is given 150 mg once a month. These medications must be taken first thing in the morning on an empty stomach with an 8 oz glass of water. Do not take it with other beverages. You must remain upright (sitting or standing--no lying down) for 30-60 minutes after taking the medication. Do not take any additional medications, beverages, or food for 30-60 minutes after taking the medication. Zoledronic acid is administered at your doctor’s office or at an infusion center. The dose is 5 mg given once a year. The duration of treatment varies for each  patient.    Side Effects  Side effects of oral bisphosphonates include muscle cramps/pain, difficulty swallowing, heartburn, abdominal pain, headache, and/or rash. Side effects of zoledronic acid include low blood pressure, dizziness, headaches, muscle pain, nausea, constipation, fever, and/or rash. These side effects may last 1-2 days and up to 10-12 days after your infusion.    There is a rare risk of developing damage to the cells within the bones of the jaw called osteonecrosis.    Tell Your Rheumatology Provider  If undergoing an invasive procedure of the jaw (tooth extraction) or a history of malignancy and/or dental infections while on bisphosphonate therapy. It is recommended that you have a good dental exam prior to starting these medications. Atypical fractures of the femur have been associated with long-term bisphosphonate therapy. Often presents as thigh pain. Notify your doctor if you develop side effects from the medications.    You should not take this medication if you have: kidney problems, low calcium levels, an inability to stand or sit upright for at least 30 minutes, or difficulty swallowing. Infusion with zoledronic acid may be preferred.    Do not take these medications if you are breastfeeding, pregnant, or may become pregnant.

## 2025-03-31 ENCOUNTER — APPOINTMENT (OUTPATIENT)
Dept: GASTROENTEROLOGY | Facility: HOSPITAL | Age: 70
End: 2025-03-31
Payer: MEDICARE

## 2025-04-12 LAB
BASOPHILS # BLD AUTO: 74 CELLS/UL (ref 0–200)
BASOPHILS NFR BLD AUTO: 0.8 %
CRP SERPL-MCNC: NORMAL MG/L
EOSINOPHIL # BLD AUTO: 28 CELLS/UL (ref 15–500)
EOSINOPHIL NFR BLD AUTO: 0.3 %
ERYTHROCYTE [DISTWIDTH] IN BLOOD BY AUTOMATED COUNT: 13.7 % (ref 11–15)
ERYTHROCYTE [SEDIMENTATION RATE] IN BLOOD BY WESTERGREN METHOD: 33 MM/H
HCT VFR BLD AUTO: 44.5 % (ref 35–45)
HGB BLD-MCNC: 14.9 G/DL (ref 11.7–15.5)
LYMPHOCYTES # BLD AUTO: 1168 CELLS/UL (ref 850–3900)
LYMPHOCYTES NFR BLD AUTO: 12.7 %
MCH RBC QN AUTO: 30.5 PG (ref 27–33)
MCHC RBC AUTO-ENTMCNC: 33.5 G/DL (ref 32–36)
MCV RBC AUTO: 91.2 FL (ref 80–100)
MONOCYTES # BLD AUTO: 377 CELLS/UL (ref 200–950)
MONOCYTES NFR BLD AUTO: 4.1 %
NEUTROPHILS # BLD AUTO: 7553 CELLS/UL (ref 1500–7800)
NEUTROPHILS NFR BLD AUTO: 82.1 %
PLATELET # BLD AUTO: 373 THOUSAND/UL (ref 140–400)
PMV BLD REES-ECKER: 10.2 FL (ref 7.5–12.5)
RBC # BLD AUTO: 4.88 MILLION/UL (ref 3.8–5.1)
WBC # BLD AUTO: 9.2 THOUSAND/UL (ref 3.8–10.8)

## 2025-04-14 LAB
BASOPHILS # BLD AUTO: 74 CELLS/UL (ref 0–200)
BASOPHILS NFR BLD AUTO: 0.8 %
CRP SERPL-MCNC: 9.3 MG/L
EOSINOPHIL # BLD AUTO: 28 CELLS/UL (ref 15–500)
EOSINOPHIL NFR BLD AUTO: 0.3 %
ERYTHROCYTE [DISTWIDTH] IN BLOOD BY AUTOMATED COUNT: 13.7 % (ref 11–15)
ERYTHROCYTE [SEDIMENTATION RATE] IN BLOOD BY WESTERGREN METHOD: 33 MM/H
HCT VFR BLD AUTO: 44.5 % (ref 35–45)
HGB BLD-MCNC: 14.9 G/DL (ref 11.7–15.5)
LYMPHOCYTES # BLD AUTO: 1168 CELLS/UL (ref 850–3900)
LYMPHOCYTES NFR BLD AUTO: 12.7 %
MCH RBC QN AUTO: 30.5 PG (ref 27–33)
MCHC RBC AUTO-ENTMCNC: 33.5 G/DL (ref 32–36)
MCV RBC AUTO: 91.2 FL (ref 80–100)
MONOCYTES # BLD AUTO: 377 CELLS/UL (ref 200–950)
MONOCYTES NFR BLD AUTO: 4.1 %
NEUTROPHILS # BLD AUTO: 7553 CELLS/UL (ref 1500–7800)
NEUTROPHILS NFR BLD AUTO: 82.1 %
PLATELET # BLD AUTO: 373 THOUSAND/UL (ref 140–400)
PMV BLD REES-ECKER: 10.2 FL (ref 7.5–12.5)
RBC # BLD AUTO: 4.88 MILLION/UL (ref 3.8–5.1)
WBC # BLD AUTO: 9.2 THOUSAND/UL (ref 3.8–10.8)

## 2025-04-16 ENCOUNTER — APPOINTMENT (OUTPATIENT)
Facility: CLINIC | Age: 70
End: 2025-04-16
Payer: MEDICARE

## 2025-04-16 VITALS
WEIGHT: 223 LBS | OXYGEN SATURATION: 95 % | SYSTOLIC BLOOD PRESSURE: 117 MMHG | DIASTOLIC BLOOD PRESSURE: 73 MMHG | BODY MASS INDEX: 33.91 KG/M2 | HEART RATE: 82 BPM

## 2025-04-16 DIAGNOSIS — M35.3 POLYMYALGIA RHEUMATICA (MULTI): Primary | ICD-10-CM

## 2025-04-16 DIAGNOSIS — Z79.899 HIGH RISK MEDICATION USE: ICD-10-CM

## 2025-04-16 DIAGNOSIS — M81.0 AGE-RELATED OSTEOPOROSIS WITHOUT CURRENT PATHOLOGICAL FRACTURE: ICD-10-CM

## 2025-04-16 PROCEDURE — 1159F MED LIST DOCD IN RCRD: CPT | Performed by: INTERNAL MEDICINE

## 2025-04-16 PROCEDURE — 3078F DIAST BP <80 MM HG: CPT | Performed by: INTERNAL MEDICINE

## 2025-04-16 PROCEDURE — 1036F TOBACCO NON-USER: CPT | Performed by: INTERNAL MEDICINE

## 2025-04-16 PROCEDURE — 3074F SYST BP LT 130 MM HG: CPT | Performed by: INTERNAL MEDICINE

## 2025-04-16 PROCEDURE — 1157F ADVNC CARE PLAN IN RCRD: CPT | Performed by: INTERNAL MEDICINE

## 2025-04-16 PROCEDURE — 1126F AMNT PAIN NOTED NONE PRSNT: CPT | Performed by: INTERNAL MEDICINE

## 2025-04-16 PROCEDURE — 3044F HG A1C LEVEL LT 7.0%: CPT | Performed by: INTERNAL MEDICINE

## 2025-04-16 PROCEDURE — 99215 OFFICE O/P EST HI 40 MIN: CPT | Performed by: INTERNAL MEDICINE

## 2025-04-16 RX ORDER — PREDNISONE 5 MG/1
TABLET ORAL
Qty: 63 TABLET | Refills: 0 | Status: SHIPPED | OUTPATIENT
Start: 2025-04-16 | End: 2025-05-14

## 2025-04-16 ASSESSMENT — ENCOUNTER SYMPTOMS
OCCASIONAL FEELINGS OF UNSTEADINESS: 0
DEPRESSION: 0
LOSS OF SENSATION IN FEET: 0

## 2025-04-16 ASSESSMENT — PATIENT HEALTH QUESTIONNAIRE - PHQ9
2. FEELING DOWN, DEPRESSED OR HOPELESS: NOT AT ALL
1. LITTLE INTEREST OR PLEASURE IN DOING THINGS: NOT AT ALL
SUM OF ALL RESPONSES TO PHQ9 QUESTIONS 1 AND 2: 0

## 2025-04-16 ASSESSMENT — PAIN SCALES - GENERAL: PAINLEVEL_OUTOF10: 0-NO PAIN

## 2025-04-16 NOTE — PROGRESS NOTES
"Subjective   Patient ID: [id number]  Leona Bourne is a 70 y.o. female who presents for Follow-up.  HPI  PMH/PSH: hypothyroid and is on synthroid, HTN, LYNDA  Social: Nonsmoker, no alcohol intake, no drug use. Does part time at [employer], she is an . Has 3 children.   FHx: Brother has gout    First visit 2/28/2025:  She first had L shoulder pain 2nd week of January 2025, she was having pain when she would try to move positions at night, or move her shoulder, sometimes even she she is laying down. Waking her up at night.   January 14th she woke up and her R shoulder was painful, L shoulder was more painful, moving through the night would exacerbate the pain. Her R hip and buttock, worse with prolonged sitting, when she is getting out of bed, L big toe started hurting and was swollen and difficulty weight bearing, changed color and it felt like it was gout. L big toe pain significantly imrpoved, but still hurts if she walks on it for too long. R posterior ankle was hurting and almost resolved.   Within a few days L hip started hurting, but not as bad as right side.   Shoulder pain goes up her neck too.      Overhead activities are painful and difficult. Prior to all of this overhead activities were not difficult to do.   Moving and walking is ok after a couple of hours in the AM.      B/l shoulder pain progressively got worse within a week and has been pretty much the same since. R hip has been pretty much the same for the last few weeks.      AM is significantly difficult, can hardly reach to turn on her lamp.   By 6-7 PM all the symptoms are heightened again.      She leads an active lifestyle, goes on trails and since January things are significantly different.      A couple of years ago she dealt with \"inflammation in both her knees\" and saw Dr. Puente for OA and got CSI 2022 and hasn't had any problems since. Effect of injections are still lasting.      She reports that her hypothyroidism is " controlled.    2/28 labs with APR   3/3 I started her on prednisone 20mg  She feels 100% improved on prednisone, tolerating it well     Current meds  Prednisone 15mg daily (started 20mg 3/3 tapering by 2.5mgq2 weeks)   Fosamax weekly started 3/14/2025    Interval Hx:   Feeling well, on prednisone 15mg, reports prescription will end by the end of this week.   No longer difficult with overhead activities, no shoulder pain or hip pain, can cross her legs now. Fatigue has improved    Denies fever, chills. Denies dry eyes, blurry vision, redness or pain or photophobia. Denies dry mouth, dental loss, loss of taste, + nasal sore from dryness, no difficulty swallowing, recurrent sinus infections. Denies chest pain. Denies shortness of breath, cough, asthma, or recurrent respiratory infections. Denies nausea, vomiting, heartburn, abdominal pain, constipation, diarrhea, melena or hematochezia. No recurrent urinary infections or STDs, no genital or anal ulcers. Denies photosensitivity, rash or lesions, Raynaud's phenomenon. Denies any numbness or tingling. Denies history of clots (arterial or venous), or abortions/miscarriages/pregnancy complications     Patient Active Problem List   Diagnosis    Benign essential hypertension    Chronic diastolic congestive heart failure    Hashimoto's thyroiditis    Hepatic steatosis    Hypothyroidism    Type 2 diabetes mellitus with hyperglycemia, without long-term current use of insulin    Vitamin D deficiency       Past Medical History:   Diagnosis Date    Achilles tendinitis, right leg 01/13/2020    Achilles tendinitis of right lower extremity    Body mass index (BMI) 37.0-37.9, adult 10/20/2020    Body mass index (BMI) of 37.0 to 37.9 in adult    Morbid (severe) obesity due to excess calories (Multi) 04/14/2021    Class 2 severe obesity due to excess calories with serious comorbidity and body mass index (BMI) of 37.0 to 37.9 in adult    Morbid (severe) obesity due to excess calories  (Multi) 04/14/2021    Class 2 severe obesity with serious comorbidity and body mass index (BMI) of 36.0 to 36.9 in adult    Personal history of diseases of the skin and subcutaneous tissue     History of rosacea    Personal history of other diseases of the circulatory system 04/14/2021    History of congestive heart failure    Personal history of other diseases of the digestive system 03/07/2016    History of esophageal reflux    Personal history of other diseases of the respiratory system 05/28/2015    History of acute sinusitis    Personal history of other malignant neoplasm of skin     Personal history of skin cancer    Personal history of other specified conditions     History of fibrocystic disease of breast    Rectal fistula     Rectal fistula    Right upper quadrant pain 03/07/2016    RUQ abdominal pain    Unspecified symptoms and signs involving the genitourinary system 06/09/2020    UTI symptoms       Past Surgical History:   Procedure Laterality Date    APPENDECTOMY  07/28/2014    Appendectomy    BREAST BIOPSY Left 1985    benign    HYSTERECTOMY  07/28/2014    Hysterectomy    OTHER SURGICAL HISTORY  07/28/2014    Reported Hx Of Breast Surgery For Biopsy    OTHER SURGICAL HISTORY  07/28/2014    Rectal Surgery Repair Of Perirectal Fistula       Social History     Socioeconomic History    Marital status:      Spouse name: Not on file    Number of children: Not on file    Years of education: Not on file    Highest education level: Not on file   Occupational History    Not on file   Tobacco Use    Smoking status: Former     Types: Cigarettes    Smokeless tobacco: Never   Vaping Use    Vaping status: Never Used   Substance and Sexual Activity    Alcohol use: Not Currently    Drug use: Never    Sexual activity: Defer   Other Topics Concern    Not on file   Social History Narrative    Not on file     Social Drivers of Health     Financial Resource Strain: Not on file   Food Insecurity: Not on file    Transportation Needs: Not on file   Physical Activity: Not on file   Stress: Not on file   Social Connections: Not on file   Intimate Partner Violence: Not on file   Housing Stability: Not on file       No Known Allergies      Current Outpatient Medications:     alendronate (Fosamax) 70 mg tablet, Take 1 tablet (70 mg) by mouth every 7 days. Take on an empty stomach. Do not lie down or eat for 1/2 hour after taking., Disp: 12 tablet, Rfl: 0    ketoconazole (NIZOral) 2 % cream, Apply topically., Disp: , Rfl:     levothyroxine (Synthroid, Levoxyl) 137 mcg tablet, Take 1 tablet (137 mcg) by mouth once daily., Disp: 90 tablet, Rfl: 1    sacubitriL-valsartan (Entresto) 24-26 mg tablet, Take by mouth twice a day., Disp: , Rfl:     sodium,potassium,mag sulfates (Suprep Bowel Prep Kit) 17.5-3.13-1.6 gram solution, Take one bottle twice as directed by the prep instructions, Disp: 354 mL, Rfl: 0    Objective     Visit Vitals  /73 (BP Location: Left arm, Patient Position: Sitting)   Pulse 82       Physical Exam  Copied from previous exam unless annotated below   General: AAOx3, Cooperative  Head: normocephalic, atraumatic  Eyes: EOMI, conjunctiva clear, sclera white, anicteric  Throat/Mouth: No oral deformities, no cheek swelling, mucosa with decreased salivary pooling, no oral ulcers noted or loss of dentition   Neck/Lymph: FROM, trachea midline  Neuro: CN II-XII grossly intact, no focal deficit  Skin: No rashes, ulcers or photosensitive areas  MSK: Upper Extremities:  Hand/Fingers: No erythema, swelling, tenderness or warmth at DIP, PIP, or MCP joints, FROM grossly. Good hand . No nodules. No deformities   Wrists: No erythema, swelling, warmth or tenderness at wrist, FROM grossly  Elbows: No tenderness, swelling, erythema or warmth at elbows, FROM grossly. No nodules   Shoulders: FROM  Lower Extremities:   Hips: FROM   Knees: L knee with mild swelling but cool, no tenderness, deformities, normal ROM grossly. +  crepitus.  Ankles: No deformities, tenderness, edema, erythema, ulceration, or warmth at the ankle     Lab Results   Component Value Date    WBC 9.2 04/11/2025    HGB 14.9 04/11/2025    HCT 44.5 04/11/2025    MCV 91.2 04/11/2025     04/11/2025       Chemistry    Lab Results   Component Value Date/Time     02/28/2025 1035    K 4.3 02/28/2025 1035    CL 99 02/28/2025 1035    CO2 28 02/28/2025 1035    BUN 19 02/28/2025 1035    CREATININE 0.88 02/28/2025 1035    Lab Results   Component Value Date/Time    CALCIUM 10.0 02/28/2025 1035    ALKPHOS 101 02/28/2025 1035    AST 15 02/28/2025 1035    ALT 10 02/28/2025 1035    BILITOT 1.3 (H) 02/28/2025 1035          Lab Results   Component Value Date    CRP 9.3 (H) 04/11/2025    ISABELLA POSITIVE (A) 02/28/2025    JO1 <1.0 NEG 02/28/2025    SPEP Normal. 02/28/2025    PTH 46 02/28/2025    CALCIUM 10.0 02/28/2025    PHOS 3.5 02/28/2025    FERRITIN 205 (H) 03/18/2022     ALKALINE PHOSPHATASE   Date Value Ref Range Status   02/28/2025 101 37 - 153 U/L Final     AST   Date Value Ref Range Status   02/28/2025 15 10 - 35 U/L Final     UREA NITROGEN (BUN)   Date Value Ref Range Status   02/28/2025 19 7 - 25 mg/dL Final     SODIUM   Date Value Ref Range Status   02/28/2025 137 135 - 146 mmol/L Final     POTASSIUM   Date Value Ref Range Status   02/28/2025 4.3 3.5 - 5.3 mmol/L Final     CARBON DIOXIDE   Date Value Ref Range Status   02/28/2025 28 20 - 32 mmol/L Final     ALT   Date Value Ref Range Status   02/28/2025 10 6 - 29 U/L Final     VITAMIN D,25-OH,TOTAL,IA   Date Value Ref Range Status   02/28/2025 32 30 - 100 ng/mL Final     Comment:     Vitamin D Status         25-OH Vitamin D:     Deficiency:                    <20 ng/mL  Insufficiency:             20 - 29 ng/mL  Optimal:                 > or = 30 ng/mL     For 25-OH Vitamin D testing on patients on   D2-supplementation and patients for whom quantitation   of D2 and D3 fractions is required, the QuestAssureD(TM)  25-OH  VIT D, (D2,D3), LC/MS/MS is recommended: order   code 04349 (patients >2yrs).     See Note 1     Note 1     For additional information, please refer to   [email or web address]   (This link is being provided for informational/  educational purposes only.)         XR foot 3+ views bilateral  Narrative: Interpreted By:  Austin Miramontes,   STUDY:  XR FOOT 3+ VIEWS BILATERAL  2/28/2025 11:14 am      INDICATION:  Signs/Symptoms:evaluate for inflammatory arthritis, gout changes, OA      COMPARISON:  None.      ACCESSION NUMBER(S):  [id number]      ORDERING CLINICIAN:  ARUNA JONES      TECHNIQUE:  Three views each of the bilateral feet including AP , oblique and  lateral projections were obtained.      FINDINGS:  There is no radiographic evidence of acute fracture or dislocation  identified. Mild degenerative changes are seen in the 1st through 3rd  tarsometatarsal articulations bilaterally. Calcaneal enthesophytes  are seen bilaterally, larger on the right than on the left.      Impression: 1. No acute fracture or dislocation identified.  2. Degenerative changes, as described above.      MACRO:  None.      Signed by: Austin Miramontes 3/1/2025 10:19 AM  Dictation workstation:   [id number]  XR hips bilateral 2 VW w pelvis when performed  Narrative: Interpreted By:  Austin Miramontes,   STUDY:  XR HIPS BILATERAL 2 VW WITH PELVIS WHEN PERFORMED 2/28/2025 11:14 am      INDICATION:  Signs/Symptoms:evaluate for inflammatory arthritis, OA      COMPARISON:  None.      ACCESSION NUMBER(S):  [id number]      ORDERING CLINICIAN:  ARUNA JONES      TECHNIQUE:  AP and lateral views each of the bilateral hips were obtained.      FINDINGS:  There is no evidence of acute fracture or dislocation identified.  Mild hypertrophic degenerative changes are seen in the sacroiliac  joints bilaterally. Minimal joint space narrowing and tiny marginal  osteophytes are seen in the hips bilaterally, greater on the left  than on  the right. Rounded calcifications are seen with throughout  the pelvis, most consistent with phleboliths.      Impression: 1.  No fracture or dislocation.  2. Degenerative changes, as described above.      MACRO:  None.      Signed by: Austin Miramontes 3/1/2025 10:18 AM  Dictation workstation:   [id number]  XR shoulder 2+ views bilateral  Narrative: Interpreted By:  Austin Miramontes,   STUDY:  XR SHOULDER 2+ VIEWS BILATERAL 2/28/2025 11:14 am      INDICATION:  Signs/Symptoms:evaluate for inflammatory arthritis, OA      COMPARISON:  None.      ACCESSION NUMBER(S):  [id number]      ORDERING CLINICIAN:  ARUNA JONES      TECHNIQUE:  Three views each of the bilateral shoulders including AP , axillary  and scapular Y-views were obtained.      FINDINGS:  There is no radiographic evidence of acute fracture or dislocation  identified.  Minimal hypertrophic degenerative changes are seen in  the acromioclavicular joints bilaterally. The glenohumeral joints are  well preserved.      Impression: 1. No evidence of acute fracture or dislocation.      MACRO:  None.      Signed by: Austin Miramontes 3/1/2025 10:17 AM  Dictation workstation:   [id number]    DEXA 2/27/2025  FINDINGS:  SPINE L1-L4  Bone Mineral Density: 1.118  T-Score 0.6  Z-Score 2.8  Classification:  Normal  Bone Mineral Density change vs baseline:  Not reported  Bone Mineral Density change vs previous: Not reported      LEFT FEMUR -TOTAL  Bone Mineral Density: 1.116  T-Score 1.4   Z-Score  3  Classification:  Normal  Bone Mineral Density change vs baseline: -4.7  Bone Mineral Density change vs previous: -2.2      LEFT FEMUR -NECK  Bone Mineral Density: 0.932  T-Score 0.8  Z-Score 2.6  Classification:  Normal          Assessment/Plan   known hypothyroidism here for new onset b/l shoulder and pelvic girdle pain and stiffness.      Impression: PMR, classical presentation and physical exam  Age>60  Symmetric B/l shoulder discomfort with stiffness, hip and neck pain.   No  headaches, jaw or tongue claudication, visual disturbance, scalp tenderness  No high grade fevers     Labs 4/11 reviewed:  Thrombocytosis improved.   CRP improving from 1.5mg/dL, currently 0.9  ESR 53-> 33 (normal for age now)     Labs 2/28 reviewed:  SPEP normal   RF, CCP neg  Vitamin D and phos normal   ISABELLA 1:80, ALIA neg  TSH elevated but normal T3  Uric acid 6.1    XR 2/28/2025 reviewed  Shoulder: AC OA   Hips: OA of SI b/l, OA of hips L>R  Feet: OA changes 1-3 TMT b/l, calcaneal enthesophytes b/l R>L     She reports ongoing significant improvement with prednisone     - Continue prednisone 15mg for total of two weeks then 12.5mg for two weeks then 10mg for two weeks then will continue to taper by 2.5mg q2 weeks if able till we reach 5mg.     DEXA reviewed from 2/27/2025  Adjusted FRAX: 10% for mop, 0.36% for hip   Since she is 10% risk with adjusted FRAX, she is medium risk and since she is on prednisone, will start fosamax   - continue fosamax, counseled on risks/benefits  - continue calcium and vitamin D      She had an acute short lived episode of L 1st MTP pain and swelling and difficulty weightbearing, very suggestive of gout episode.  Uric acid 6.1mg/dL  - This is the first episode so need for ULT unless labs show otherwise or recurrence     RTC in 1 month     Amina Kinsey MD  Division of Rheumatology   [facility]

## 2025-04-22 ENCOUNTER — APPOINTMENT (OUTPATIENT)
Dept: PODIATRY | Facility: CLINIC | Age: 70
End: 2025-04-22
Payer: COMMERCIAL

## 2025-04-22 DIAGNOSIS — M10.072 ACUTE IDIOPATHIC GOUT OF LEFT FOOT: Primary | ICD-10-CM

## 2025-04-22 DIAGNOSIS — E11.9 ENCOUNTER FOR DIABETIC FOOT EXAM (MULTI): ICD-10-CM

## 2025-04-22 PROCEDURE — 99203 OFFICE O/P NEW LOW 30 MIN: CPT | Performed by: PODIATRIST

## 2025-04-22 PROCEDURE — 1160F RVW MEDS BY RX/DR IN RCRD: CPT | Performed by: PODIATRIST

## 2025-04-22 PROCEDURE — 1036F TOBACCO NON-USER: CPT | Performed by: PODIATRIST

## 2025-04-22 PROCEDURE — 3044F HG A1C LEVEL LT 7.0%: CPT | Performed by: PODIATRIST

## 2025-04-22 PROCEDURE — 1159F MED LIST DOCD IN RCRD: CPT | Performed by: PODIATRIST

## 2025-04-22 PROCEDURE — 1157F ADVNC CARE PLAN IN RCRD: CPT | Performed by: PODIATRIST

## 2025-04-22 NOTE — PROGRESS NOTES
History of Present Illness:   Patient states they are here for foot pain  Most bothersome when walking  Denies trauma to area    States she has history of gout  Had uric acid, was normal  Is Dm, most recent A1C was 6.0 in Jan 2025    Looking for relief  NO other pedal complaints at this time      Past Medical History  Medical History[1]    Medications and Allergies have been reviewed.    Review Of Systems:  GENERAL: No weight loss, malaise or fevers.  HEENT: Negative for frequent or significant headaches,   RESPIRATORY: Negative for cough, wheezing or shortness of breath.  CARDIOVASCULAR: Negative for chest pain, leg swelling or palpitations.      Examination of Both Lower Extremities:   Objective:   Vasc: DP and PT pulses are palpable bilateral.  CFT is less than 3 seconds bilateral.  Skin temperature is warm to cool proximal to distal bilateral.      Neuro: Vibratory, light touch and proprioception are intact bilateral.     Derm: Nails 1-5 bilateral are intact.  Skin is supple with normal texture and turgor noted.  Webspaces are clean, dry and intact bilateral.  No open wounds noted     Ortho: Muscle strength is 5/5 for all pedal groups tested.  Pain with ROM to big joint 1st MTPJ. Mild edema noted. NO ecchymosis or erythema noted  1. Acute idiopathic gout of left foot  Uric acid    Uric acid      2. Encounter for diabetic foot exam (Multi)          Patient exam and eval  Discussed arthritic changes in feet  Discussed gout symptoms vs arthritis symptoms and the overlap  Discussed and reviewe A1C and uric acid  Recommend stiff supportive shoe gear  Shoes that do not twist or bend  Minimize walking barefoot  Discussed 80/20 rule  Discussed ice, nsaids, ana miller/biofreeze  Placed order for uric acid, if symptoms arise can get lab completed at any  lab. Will then call with results  Reviewed xrays from March - degerative changes noted  Patient to follow up if no improvement noted.   Pt in agreement to plan  All  questions answered    Fu yearly for Dm exam if not sooner.           Lila Sonya, DPANABELL  [phone]  [facility]  Fax: [phone]         [1]   Past Medical History:  Diagnosis Date    Achilles tendinitis, right leg 01/13/2020    Achilles tendinitis of right lower extremity    Body mass index (BMI) 37.0-37.9, adult 10/20/2020    Body mass index (BMI) of 37.0 to 37.9 in adult    Morbid (severe) obesity due to excess calories (Multi) 04/14/2021    Class 2 severe obesity due to excess calories with serious comorbidity and body mass index (BMI) of 37.0 to 37.9 in adult    Morbid (severe) obesity due to excess calories (Multi) 04/14/2021    Class 2 severe obesity with serious comorbidity and body mass index (BMI) of 36.0 to 36.9 in adult    Personal history of diseases of the skin and subcutaneous tissue     History of rosacea    Personal history of other diseases of the circulatory system 04/14/2021    History of congestive heart failure    Personal history of other diseases of the digestive system 03/07/2016    History of esophageal reflux    Personal history of other diseases of the respiratory system 05/28/2015    History of acute sinusitis    Personal history of other malignant neoplasm of skin     Personal history of skin cancer    Personal history of other specified conditions     History of fibrocystic disease of breast    Rectal fistula     Rectal fistula    Right upper quadrant pain 03/07/2016    RUQ abdominal pain    Unspecified symptoms and signs involving the genitourinary system 06/09/2020    UTI symptoms

## 2025-05-07 ENCOUNTER — APPOINTMENT (OUTPATIENT)
Facility: CLINIC | Age: 70
End: 2025-05-07
Payer: MEDICARE

## 2025-05-07 DIAGNOSIS — M35.3 POLYMYALGIA RHEUMATICA (MULTI): ICD-10-CM

## 2025-05-07 DIAGNOSIS — M81.0 AGE-RELATED OSTEOPOROSIS WITHOUT CURRENT PATHOLOGICAL FRACTURE: ICD-10-CM

## 2025-05-07 PROCEDURE — 3044F HG A1C LEVEL LT 7.0%: CPT | Performed by: INTERNAL MEDICINE

## 2025-05-07 PROCEDURE — 99215 OFFICE O/P EST HI 40 MIN: CPT | Performed by: INTERNAL MEDICINE

## 2025-05-07 RX ORDER — PREDNISONE 5 MG/1
TABLET ORAL
Qty: 135 TABLET | Refills: 0 | Status: SHIPPED | OUTPATIENT
Start: 2025-05-07 | End: 2025-08-05

## 2025-05-07 RX ORDER — ALENDRONATE SODIUM 70 MG/1
70 TABLET ORAL
Qty: 12 TABLET | Refills: 0 | Status: SHIPPED | OUTPATIENT
Start: 2025-05-07 | End: 2025-08-05

## 2025-05-07 NOTE — PROGRESS NOTES
Subjective   Patient ID: [id number]  Leona Bourne is a 70 y.o. female who presents for follow up    Virtual or Telephone Consent    An interactive audio and video telecommunication system which permits real time communications between the patient (at the originating site) and provider (at the distant site) was utilized to provide this telehealth service.   Verbal consent was requested and obtained from Leona Bourne on this date, 05/07/25 for a telehealth visit and the patient's location was confirmed at the time of the visit.      HPI  PMH/PSH: hypothyroid and is on synthroid, HTN, LYNDA  Social: Nonsmoker, no alcohol intake, no drug use. Does part time at [employer], she is an . Has 3 children.   FHx: Brother has gout    First visit 2/28/2025:  She first had L shoulder pain 2nd week of January 2025, she was having pain when she would try to move positions at night, or move her shoulder, sometimes even she she is laying down. Waking her up at night.   January 14th she woke up and her R shoulder was painful, L shoulder was more painful, moving through the night would exacerbate the pain. Her R hip and buttock, worse with prolonged sitting, when she is getting out of bed, L big toe started hurting and was swollen and difficulty weight bearing, changed color and it felt like it was gout. L big toe pain significantly imrpoved, but still hurts if she walks on it for too long. R posterior ankle was hurting and almost resolved.   Within a few days L hip started hurting, but not as bad as right side.   Shoulder pain goes up her neck too.      Overhead activities are painful and difficult. Prior to all of this overhead activities were not difficult to do.   Moving and walking is ok after a couple of hours in the AM.      B/l shoulder pain progressively got worse within a week and has been pretty much the same since. R hip has been pretty much the same for the last few weeks.      AM is significantly  "difficult, can hardly reach to turn on her lamp.   By 6-7 PM all the symptoms are heightened again.      She leads an active lifestyle, goes on trails and since January things are significantly different.      A couple of years ago she dealt with \"inflammation in both her knees\" and saw Dr. Puente for OA and got CSI 2022 and hasn't had any problems since. Effect of injections are still lasting.      She reports that her hypothyroidism is controlled.    2/28 labs with APR   3/3 I started her on prednisone 20mg  She feels 100% improved on prednisone, tolerating it well     Current meds  Prednisone 10mg daily (started 20mg 3/3 tapering by 2.5mgq2 weeks)   Fosamax weekly started 3/14/2025    Interval Hx:   Marked calendar wrong and did 12.5mg for an extra week, has been on 10mg fortwo days now, feeling well, no problems with the taper.   No longer difficult with overhead activities, no shoulder pain or hip pain, can cross her legs now. Fatigue has improved    Denies fever, chills. Denies dry eyes, blurry vision, redness or pain or photophobia. Denies dry mouth, dental loss, loss of taste, + nasal sore from dryness, no difficulty swallowing, recurrent sinus infections. Denies chest pain. Denies shortness of breath, cough, asthma, or recurrent respiratory infections. Denies nausea, vomiting, heartburn, abdominal pain, constipation, diarrhea, melena or hematochezia. No recurrent urinary infections or STDs, no genital or anal ulcers. Denies photosensitivity, rash or lesions, Raynaud's phenomenon. Denies any numbness or tingling. Denies history of clots (arterial or venous), or abortions/miscarriages/pregnancy complications     Patient Active Problem List   Diagnosis    Benign essential hypertension    Chronic diastolic congestive heart failure    Hashimoto's thyroiditis    Hepatic steatosis    Hypothyroidism    Type 2 diabetes mellitus with hyperglycemia, without long-term current use of insulin    Vitamin D deficiency "       Past Medical History:   Diagnosis Date    Achilles tendinitis, right leg 01/13/2020    Achilles tendinitis of right lower extremity    Body mass index (BMI) 37.0-37.9, adult 10/20/2020    Body mass index (BMI) of 37.0 to 37.9 in adult    Morbid (severe) obesity due to excess calories (Multi) 04/14/2021    Class 2 severe obesity due to excess calories with serious comorbidity and body mass index (BMI) of 37.0 to 37.9 in adult    Morbid (severe) obesity due to excess calories (Multi) 04/14/2021    Class 2 severe obesity with serious comorbidity and body mass index (BMI) of 36.0 to 36.9 in adult    Personal history of diseases of the skin and subcutaneous tissue     History of rosacea    Personal history of other diseases of the circulatory system 04/14/2021    History of congestive heart failure    Personal history of other diseases of the digestive system 03/07/2016    History of esophageal reflux    Personal history of other diseases of the respiratory system 05/28/2015    History of acute sinusitis    Personal history of other malignant neoplasm of skin     Personal history of skin cancer    Personal history of other specified conditions     History of fibrocystic disease of breast    Rectal fistula     Rectal fistula    Right upper quadrant pain 03/07/2016    RUQ abdominal pain    Unspecified symptoms and signs involving the genitourinary system 06/09/2020    UTI symptoms       Past Surgical History:   Procedure Laterality Date    APPENDECTOMY  07/28/2014    Appendectomy    BREAST BIOPSY Left 1985    benign    HYSTERECTOMY  07/28/2014    Hysterectomy    OTHER SURGICAL HISTORY  07/28/2014    Reported Hx Of Breast Surgery For Biopsy    OTHER SURGICAL HISTORY  07/28/2014    Rectal Surgery Repair Of Perirectal Fistula       Social History     Socioeconomic History    Marital status:      Spouse name: Not on file    Number of children: Not on file    Years of education: Not on file    Highest education  level: Not on file   Occupational History    Not on file   Tobacco Use    Smoking status: Former     Types: Cigarettes    Smokeless tobacco: Never   Vaping Use    Vaping status: Never Used   Substance and Sexual Activity    Alcohol use: Not Currently    Drug use: Never    Sexual activity: Defer   Other Topics Concern    Not on file   Social History Narrative    Not on file     Social Drivers of Health     Financial Resource Strain: Not on file   Food Insecurity: Not on file   Transportation Needs: Not on file   Physical Activity: Not on file   Stress: Not on file   Social Connections: Not on file   Intimate Partner Violence: Not on file   Housing Stability: Not on file       No Known Allergies      Current Outpatient Medications:     alendronate (Fosamax) 70 mg tablet, Take 1 tablet (70 mg) by mouth every 7 days. Take on an empty stomach. Do not lie down or eat for 1/2 hour after taking., Disp: 12 tablet, Rfl: 0    ketoconazole (NIZOral) 2 % cream, Apply topically., Disp: , Rfl:     levothyroxine (Synthroid, Levoxyl) 137 mcg tablet, Take 1 tablet (137 mcg) by mouth once daily., Disp: 90 tablet, Rfl: 1    predniSONE (Deltasone) 5 mg tablet, Take 2.5 tablets (12.5 mg) by mouth once daily for 14 days, THEN 2 tablets (10 mg) once daily for 14 days., Disp: 63 tablet, Rfl: 0    sacubitriL-valsartan (Entresto) 24-26 mg tablet, Take by mouth twice a day., Disp: , Rfl:     sodium,potassium,mag sulfates (Suprep Bowel Prep Kit) 17.5-3.13-1.6 gram solution, Take one bottle twice as directed by the prep instructions, Disp: 354 mL, Rfl: 0    Objective     There were no vitals taken for this visit.      Physical Exam  Virtual visit, FROM on exam      Lab Results   Component Value Date    WBC 9.2 04/11/2025    HGB 14.9 04/11/2025    HCT 44.5 04/11/2025    MCV 91.2 04/11/2025     04/11/2025       Chemistry    Lab Results   Component Value Date/Time     02/28/2025 1035    K 4.3 02/28/2025 1035    CL 99 02/28/2025 1035     CO2 28 02/28/2025 1035    BUN 19 02/28/2025 1035    CREATININE 0.88 02/28/2025 1035    Lab Results   Component Value Date/Time    CALCIUM 10.0 02/28/2025 1035    ALKPHOS 101 02/28/2025 1035    AST 15 02/28/2025 1035    ALT 10 02/28/2025 1035    BILITOT 1.3 (H) 02/28/2025 1035          Lab Results   Component Value Date    CRP 9.3 (H) 04/11/2025    ISABELLA POSITIVE (A) 02/28/2025    JO1 <1.0 NEG 02/28/2025    SPEP Normal. 02/28/2025    PTH 46 02/28/2025    CALCIUM 10.0 02/28/2025    PHOS 3.5 02/28/2025    FERRITIN 205 (H) 03/18/2022     ALKALINE PHOSPHATASE   Date Value Ref Range Status   02/28/2025 101 37 - 153 U/L Final     AST   Date Value Ref Range Status   02/28/2025 15 10 - 35 U/L Final     UREA NITROGEN (BUN)   Date Value Ref Range Status   02/28/2025 19 7 - 25 mg/dL Final     SODIUM   Date Value Ref Range Status   02/28/2025 137 135 - 146 mmol/L Final     POTASSIUM   Date Value Ref Range Status   02/28/2025 4.3 3.5 - 5.3 mmol/L Final     CARBON DIOXIDE   Date Value Ref Range Status   02/28/2025 28 20 - 32 mmol/L Final     ALT   Date Value Ref Range Status   02/28/2025 10 6 - 29 U/L Final     VITAMIN D,25-OH,TOTAL,IA   Date Value Ref Range Status   02/28/2025 32 30 - 100 ng/mL Final     Comment:     Vitamin D Status         25-OH Vitamin D:     Deficiency:                    <20 ng/mL  Insufficiency:             20 - 29 ng/mL  Optimal:                 > or = 30 ng/mL     For 25-OH Vitamin D testing on patients on   D2-supplementation and patients for whom quantitation   of D2 and D3 fractions is required, the QuestAssureD(TM)  25-OH VIT D, (D2,D3), LC/MS/MS is recommended: order   code 20909 (patients >2yrs).     See Note 1     Note 1     For additional information, please refer to   http://[email or web address]   (This link is being provided for informational/  educational purposes only.)         XR foot 3+ views bilateral  Narrative: Interpreted By:  Austin Miramontes,   STUDY:  XR FOOT 3+ VIEWS  BILATERAL  2/28/2025 11:14 am      INDICATION:  Signs/Symptoms:evaluate for inflammatory arthritis, gout changes, OA      COMPARISON:  None.      ACCESSION NUMBER(S):  [id number]      ORDERING CLINICIAN:  ARUNA JONES      TECHNIQUE:  Three views each of the bilateral feet including AP , oblique and  lateral projections were obtained.      FINDINGS:  There is no radiographic evidence of acute fracture or dislocation  identified. Mild degenerative changes are seen in the 1st through 3rd  tarsometatarsal articulations bilaterally. Calcaneal enthesophytes  are seen bilaterally, larger on the right than on the left.      Impression: 1. No acute fracture or dislocation identified.  2. Degenerative changes, as described above.      MACRO:  None.      Signed by: Austin Miramontes 3/1/2025 10:19 AM  Dictation workstation:   [id number]  XR hips bilateral 2 VW w pelvis when performed  Narrative: Interpreted By:  Austin Miramontes,   STUDY:  XR HIPS BILATERAL 2 VW WITH PELVIS WHEN PERFORMED 2/28/2025 11:14 am      INDICATION:  Signs/Symptoms:evaluate for inflammatory arthritis, OA      COMPARISON:  None.      ACCESSION NUMBER(S):  [id number]      ORDERING CLINICIAN:  ARUNA JONES      TECHNIQUE:  AP and lateral views each of the bilateral hips were obtained.      FINDINGS:  There is no evidence of acute fracture or dislocation identified.  Mild hypertrophic degenerative changes are seen in the sacroiliac  joints bilaterally. Minimal joint space narrowing and tiny marginal  osteophytes are seen in the hips bilaterally, greater on the left  than on the right. Rounded calcifications are seen with throughout  the pelvis, most consistent with phleboliths.      Impression: 1.  No fracture or dislocation.  2. Degenerative changes, as described above.      MACRO:  None.      Signed by: Austin Miramontes 3/1/2025 10:18 AM  Dictation workstation:   [id number]  XR shoulder 2+ views bilateral  Narrative: Interpreted By:  Austin Miramontes,    STUDY:  XR SHOULDER 2+ VIEWS BILATERAL 2/28/2025 11:14 am      INDICATION:  Signs/Symptoms:evaluate for inflammatory arthritis, OA      COMPARISON:  None.      ACCESSION NUMBER(S):  [id number]      ORDERING CLINICIAN:  ARUNA JONES      TECHNIQUE:  Three views each of the bilateral shoulders including AP , axillary  and scapular Y-views were obtained.      FINDINGS:  There is no radiographic evidence of acute fracture or dislocation  identified.  Minimal hypertrophic degenerative changes are seen in  the acromioclavicular joints bilaterally. The glenohumeral joints are  well preserved.      Impression: 1. No evidence of acute fracture or dislocation.      MACRO:  None.      Signed by: Austin Miramontes 3/1/2025 10:17 AM  Dictation workstation:   [id number]    DEXA 2/27/2025  FINDINGS:  SPINE L1-L4  Bone Mineral Density: 1.118  T-Score 0.6  Z-Score 2.8  Classification:  Normal  Bone Mineral Density change vs baseline:  Not reported  Bone Mineral Density change vs previous: Not reported      LEFT FEMUR -TOTAL  Bone Mineral Density: 1.116  T-Score 1.4   Z-Score  3  Classification:  Normal  Bone Mineral Density change vs baseline: -4.7  Bone Mineral Density change vs previous: -2.2      LEFT FEMUR -NECK  Bone Mineral Density: 0.932  T-Score 0.8  Z-Score 2.6  Classification:  Normal          Assessment/Plan   known hypothyroidism here for new onset b/l shoulder and pelvic girdle pain and stiffness.      Impression: PMR, classical presentation and physical exam  Age>60  Symmetric B/l shoulder discomfort with stiffness, hip and neck pain.   No headaches, jaw or tongue claudication, visual disturbance, scalp tenderness  No high grade fevers     Labs 4/11 reviewed:  Thrombocytosis improved  CRP improving from 1.5mg/dL, currently 0.9  ESR 53-> 33 (normal for age now)     Labs 2/28 reviewed:  SPEP normal   RF, CCP neg  Vitamin D and phos normal   ISABELLA 1:80, ALIA neg  TSH elevated but normal T3  Uric acid 6.1    XR 2/28/2025  reviewed  Shoulder: AC OA   Hips: OA of SI b/l, OA of hips L>R  Feet: OA changes 1-3 TMT b/l, calcaneal enthesophytes b/l R>L     She reports ongoing significant improvement with prednisone and continues to do well with the taper.     - continue prednisone 10mg for 4 weeks then taper to 7.5mg daily for a month then 5mg daily for 30 days and following that plan to taper by 1mg increments.   - Labs 1 week prior to follow up    DEXA reviewed from 2/27/2025  Adjusted FRAX: 10% for mop, 0.36% for hip   Since she is 10% risk with adjusted FRAX, she is medium risk and since she is on prednisone, will start fosamax   - continue fosamax, counseled on risks/benefits  - continue calcium and vitamin D      She had an acute short lived episode of L 1st MTP pain and swelling and difficulty weightbearing, very suggestive of gout episode.  Uric acid 6.1mg/dL  - This is the first episode so need for ULT unless labs show otherwise or recurrence     RTC in 3 months     Amina Kinsey MD  Division of Rheumatology   [facility]

## 2025-05-12 ENCOUNTER — APPOINTMENT (OUTPATIENT)
Dept: PREADMISSION TESTING | Facility: HOSPITAL | Age: 70
End: 2025-05-12
Payer: MEDICARE

## 2025-05-19 ENCOUNTER — ANESTHESIA EVENT (OUTPATIENT)
Dept: GASTROENTEROLOGY | Facility: HOSPITAL | Age: 70
End: 2025-05-19
Payer: MEDICARE

## 2025-05-19 ENCOUNTER — PRE-ADMISSION TESTING (OUTPATIENT)
Dept: PREADMISSION TESTING | Facility: HOSPITAL | Age: 70
End: 2025-05-19
Payer: MEDICARE

## 2025-05-19 VITALS — HEIGHT: 68 IN | BODY MASS INDEX: 33.8 KG/M2 | WEIGHT: 223 LBS

## 2025-05-19 RX ORDER — CHOLECALCIFEROL (VITAMIN D3) 25 MCG
25 TABLET ORAL DAILY
COMMUNITY

## 2025-05-19 RX ORDER — IBUPROFEN 200 MG
1 CAPSULE ORAL DAILY
COMMUNITY

## 2025-05-19 ASSESSMENT — DUKE ACTIVITY SCORE INDEX (DASI)
CAN YOU DO YARD WORK LIKE RAKING LEAVES, WEEDING OR PUSHING A MOWER: YES
CAN YOU CLIMB A FLIGHT OF STAIRS OR WALK UP A HILL: YES
CAN YOU WALK INDOORS, SUCH AS AROUND YOUR HOUSE: YES
CAN YOU RUN A SHORT DISTANCE: NO
CAN YOU PARTICIPATE IN MODERATE RECREATIONAL ACTIVITIES LIKE GOLF, BOWLING, DANCING, DOUBLES TENNIS OR THROWING A BASEBALL OR FOOTBALL: YES
CAN YOU PARTICIPATE IN STRENOUS SPORTS LIKE SWIMMING, SINGLES TENNIS, FOOTBALL, BASKETBALL, OR SKIING: NO
DASI METS SCORE: 8
CAN YOU DO HEAVY WORK AROUND THE HOUSE LIKE SCRUBBING FLOORS OR LIFTING AND MOVING HEAVY FURNITURE: YES
CAN YOU DO MODERATE WORK AROUND THE HOUSE LIKE VACUUMING, SWEEPING FLOORS OR CARRYING GROCERIES: YES
CAN YOU TAKE CARE OF YOURSELF (EAT, DRESS, BATHE, OR USE TOILET): YES
TOTAL_SCORE: 42.7
CAN YOU HAVE SEXUAL RELATIONS: YES
CAN YOU WALK A BLOCK OR TWO ON LEVEL GROUND: YES
CAN YOU DO LIGHT WORK AROUND THE HOUSE LIKE DUSTING OR WASHING DISHES: YES

## 2025-05-19 NOTE — PREPROCEDURE INSTRUCTIONS
Medication List            Accurate as of May 19, 2025 11:17 AM. Always use your most recent med list.                alendronate 70 mg tablet  Commonly known as: Fosamax  Take 1 tablet (70 mg) by mouth every 7 days. Take on an empty stomach. Do not lie down or eat for 1/2 hour after taking.  Medication Adjustments for Surgery: Take last dose 1 day (24 hours) before surgery     calcium citrate 950 mg (200 mg elemental) tablet  Commonly known as: Calcitrate  Additional Medication Adjustments for Surgery: Take last dose 7 days before surgery     Entresto 24-26 mg tablet  Generic drug: sacubitriL-valsartan  Medication Adjustments for Surgery: Take/Use as prescribed     levothyroxine 137 mcg tablet  Commonly known as: Synthroid, Levoxyl  Take 1 tablet (137 mcg) by mouth once daily.  Medication Adjustments for Surgery: Take/Use as prescribed     predniSONE 5 mg tablet  Commonly known as: Deltasone  Take 2 tablets (10 mg) by mouth once daily for 30 days, THEN 1.5 tablets (7.5 mg) once daily for 30 days, THEN 1 tablet (5 mg) once daily.  Start taking on: May 7, 2025  Medication Adjustments for Surgery: Do Not take on the morning of surgery     sodium,potassium,mag sulfates 17.5-3.13-1.6 gram solution  Commonly known as: Suprep Bowel Prep Kit  Take one bottle twice as directed by the prep instructions  Medication Adjustments for Surgery: Take on the morning of surgery     Vitamin D3 25 mcg (1,000 units) tablet  Generic drug: cholecalciferol  Additional Medication Adjustments for Surgery: Take last dose 7 days before surgery                              NPO Instructions:    Please follow your prep instructions exactly. Begin a clear liquid diet the day before your procedure with no solid foods until after your colonoscopy. You may have those clear liquids up to 3 hours prior to your procedure. No dairy products and nothing red or purple. Review your medication instructions, take indicated medications with just a sip of  water the day of your procedure. If you are a diabetic, please do not take any diabetic medications the day of your procedure.      Additional Instructions:     When you come in for your procedure/surgery, we ask that you park in the Emergency Department (ED).  When you come through the ED doors, we ask that you continue past until you meet a wall. Turn right and our back elevator is down the hallway on the right. Our Outpatient Surgery window is on the 2nd floor and to the left after you step off the elevator. When you check you may need to provide your photo ID and your health insurance card so please bring them with you. We will ask for your ride to go to the waiting area while we are checking you into our admitting area. Once you are ready for your procedure/surgery and are willing, your friend or family member may come sit and wait with you.     If you get ill at all before your procedure please call your doctor or surgeon. We want you in the best shape that is possible. Any sickness might lead to your procedure being delayed or cancelled and changed to another day.      It is preferred that you not bring any money, credit cards or jewelry with you. We take good care of your belongings but we would never want to lose anything of value or importance. If you wear glasses, please bring a glasses case. If you wear dentures or partials, we provide a denture cup if we need them to come out.     Please make arrangements for someone to drive you to your procedure/surgery, stay at the hospital, and drive you back home. You will not be allowed to drive after your procedure/surgery or drive the rest of your surgery day. We do recommend having someone stay with you for 24 hours because you will be under the influence for 24 hours.     The day before your procedure/surgery (Friday for Monday procedures) we will call you to let you know the time of your surgery and when we need you to be here. If you would like you can also  call us between 1pm-3pm. Our number is [phone]. Please use that number for any questions or concerns you may have. Our unit operates more like a business then a 24 hour hospital. You can reach the  Monday-Friday 7am-4pm (exception is major holidays) if you want to talk to a real person or you can leave a voicemail and we will get back to you as soon as we can.

## 2025-05-19 NOTE — ANESTHESIA PREPROCEDURE EVALUATION
Patient: Leona Bourne    Procedure Information       Date/Time: 06/02/25 0830    Scheduled providers: Jonnie Green MD    Procedure: COLONOSCOPY    Location: [facility]            Relevant Problems   Anesthesia (within normal limits)      Cardiac   (+) Benign essential hypertension   (+) Chronic diastolic congestive heart failure      Pulmonary (within normal limits)      Neuro (within normal limits)      GI (within normal limits)      /Renal (within normal limits)      Liver   (+) Hepatic steatosis      Endocrine   (+) Hashimoto's thyroiditis   (+) Hypothyroidism   (+) Type 2 diabetes mellitus with hyperglycemia, without long-term current use of insulin   (+) Vitamin D deficiency      Hematology (within normal limits)      Musculoskeletal (within normal limits)      HEENT (within normal limits)      ID (within normal limits)      Skin (within normal limits)      GYN (within normal limits)     There were no vitals filed for this visit.    Surgical History[1]  Medical History[2]  Current Medications[3]  Prior to Admission medications    Medication Sig Start Date End Date Taking? Authorizing Provider   alendronate (Fosamax) 70 mg tablet Take 1 tablet (70 mg) by mouth every 7 days. Take on an empty stomach. Do not lie down or eat for 1/2 hour after taking. 5/7/25 8/5/25  Amina Kinsey MD   ketoconazole (NIZOral) 2 % cream Apply topically. 2/3/25   Historical Provider, MD   levothyroxine (Synthroid, Levoxyl) 137 mcg tablet Take 1 tablet (137 mcg) by mouth once daily. 1/27/25   Linda Vitale PA-C   predniSONE (Deltasone) 5 mg tablet Take 2.5 tablets (12.5 mg) by mouth once daily for 14 days, THEN 2 tablets (10 mg) once daily for 14 days. 4/16/25 5/14/25  Amina Kinsey MD   predniSONE (Deltasone) 5 mg tablet Take 2 tablets (10 mg) by mouth once daily for 30 days, THEN 1.5 tablets (7.5 mg) once daily for 30 days, THEN 1 tablet (5 mg) once daily. 5/7/25 8/5/25  Amina Kinsey MD    sacubitriL-valsartan (Entresto) 24-26 mg tablet Take by mouth twice a day. 3/15/21   Historical Provider, MD   sodium,potassium,mag sulfates (Suprep Bowel Prep Kit) 17.5-3.13-1.6 gram solution Take one bottle twice as directed by the prep instructions 12/12/24   Jonnie Green MD     RX Allergies[4]  Social History     Tobacco Use    Smoking status: Former     Types: Cigarettes    Smokeless tobacco: Never   Substance Use Topics    Alcohol use: Not Currently         Chemistry    Lab Results   Component Value Date/Time     02/28/2025 1035    K 4.3 02/28/2025 1035    CL 99 02/28/2025 1035    CO2 28 02/28/2025 1035    BUN 19 02/28/2025 1035    CREATININE 0.88 02/28/2025 1035    Lab Results   Component Value Date/Time    CALCIUM 10.0 02/28/2025 1035    ALKPHOS 101 02/28/2025 1035    AST 15 02/28/2025 1035    ALT 10 02/28/2025 1035    BILITOT 1.3 (H) 02/28/2025 1035          Lab Results   Component Value Date/Time    WBC 9.2 04/11/2025 1408    HGB 14.9 04/11/2025 1408    HCT 44.5 04/11/2025 1408     04/11/2025 1408     Lab Results   Component Value Date/Time    PROTIME 12.3 12/27/2022 0925    INR 1.1 12/27/2022 0925     No results found for this or any previous visit (from the past 4464 hours).  No results found for this or any previous visit from the past 1095 days.      Clinical information reviewed:                 Chart reviewed.  No clearances ordered.    [facility] 2022-  Post Procedure Diagnosis: Normal coronaries, normal LV systolic function, Normal  LVEDP.    NPO Detail:  No data recorded     Physical Exam    Airway  Mallampati: II  TM distance: >3 FB  Neck ROM: full  Mouth opening: 3 or more finger widths     Cardiovascular    Dental    Pulmonary    Abdominal            Anesthesia Plan    History of general anesthesia?: yes  History of complications of general anesthesia?: no    ASA 3     MAC     The patient is not a current smoker.    intravenous induction   Anesthetic plan and risks discussed with  patient.             [1]   Past Surgical History:  Procedure Laterality Date    APPENDECTOMY  07/28/2014    Appendectomy    BREAST BIOPSY Left 1985    benign    BREAST CYST ASPIRATION  1983    HYSTERECTOMY  07/28/2014    Hysterectomy    OTHER SURGICAL HISTORY  07/28/2014    Reported Hx Of Breast Surgery For Biopsy    OTHER SURGICAL HISTORY  07/28/2014    Rectal Surgery Repair Of Perirectal Fistula   [2]   Past Medical History:  Diagnosis Date    Achilles tendinitis, right leg 01/13/2020    Achilles tendinitis of right lower extremity    Body mass index (BMI) 37.0-37.9, adult 10/20/2020    Body mass index (BMI) of 37.0 to 37.9 in adult    Breast cyst 1983    Callus 04/2025    Hypertension 5/1/2016    Morbid (severe) obesity due to excess calories (Multi) 04/14/2021    Class 2 severe obesity due to excess calories with serious comorbidity and body mass index (BMI) of 37.0 to 37.9 in adult    Morbid (severe) obesity due to excess calories (Multi) 04/14/2021    Class 2 severe obesity with serious comorbidity and body mass index (BMI) of 36.0 to 36.9 in adult    Personal history of diseases of the skin and subcutaneous tissue     History of rosacea    Personal history of other diseases of the circulatory system 04/14/2021    History of congestive heart failure    Personal history of other diseases of the digestive system 03/07/2016    History of esophageal reflux    Personal history of other diseases of the respiratory system 05/28/2015    History of acute sinusitis    Personal history of other malignant neoplasm of skin     Personal history of skin cancer    Personal history of other specified conditions     History of fibrocystic disease of breast    Rectal fistula     Rectal fistula    Right upper quadrant pain 03/07/2016    RUQ abdominal pain    Unspecified symptoms and signs involving the genitourinary system 06/09/2020    UTI symptoms   [3]   Current Outpatient Medications:     alendronate (Fosamax) 70 mg tablet,  Take 1 tablet (70 mg) by mouth every 7 days. Take on an empty stomach. Do not lie down or eat for 1/2 hour after taking., Disp: 12 tablet, Rfl: 0    ketoconazole (NIZOral) 2 % cream, Apply topically., Disp: , Rfl:     levothyroxine (Synthroid, Levoxyl) 137 mcg tablet, Take 1 tablet (137 mcg) by mouth once daily., Disp: 90 tablet, Rfl: 1    predniSONE (Deltasone) 5 mg tablet, Take 2 tablets (10 mg) by mouth once daily for 30 days, THEN 1.5 tablets (7.5 mg) once daily for 30 days, THEN 1 tablet (5 mg) once daily., Disp: 135 tablet, Rfl: 0    sacubitriL-valsartan (Entresto) 24-26 mg tablet, Take by mouth twice a day., Disp: , Rfl:     sodium,potassium,mag sulfates (Suprep Bowel Prep Kit) 17.5-3.13-1.6 gram solution, Take one bottle twice as directed by the prep instructions, Disp: 354 mL, Rfl: 0  [4] No Known Allergies

## 2025-06-02 ENCOUNTER — HOSPITAL ENCOUNTER (OUTPATIENT)
Dept: GASTROENTEROLOGY | Facility: HOSPITAL | Age: 70
Discharge: HOME | End: 2025-06-02
Payer: MEDICARE

## 2025-06-02 ENCOUNTER — ANESTHESIA (OUTPATIENT)
Dept: GASTROENTEROLOGY | Facility: HOSPITAL | Age: 70
End: 2025-06-02
Payer: MEDICARE

## 2025-06-02 VITALS
OXYGEN SATURATION: 97 % | TEMPERATURE: 97.3 F | HEART RATE: 86 BPM | RESPIRATION RATE: 18 BRPM | SYSTOLIC BLOOD PRESSURE: 147 MMHG | DIASTOLIC BLOOD PRESSURE: 70 MMHG | BODY MASS INDEX: 33.8 KG/M2 | WEIGHT: 223 LBS | HEIGHT: 68 IN

## 2025-06-02 DIAGNOSIS — Z12.11 SCREENING FOR COLON CANCER: ICD-10-CM

## 2025-06-02 PROCEDURE — 3700000002 HC GENERAL ANESTHESIA TIME - EACH INCREMENTAL 1 MINUTE

## 2025-06-02 PROCEDURE — 7100000009 HC PHASE TWO TIME - INITIAL BASE CHARGE

## 2025-06-02 PROCEDURE — 2500000004 HC RX 250 GENERAL PHARMACY W/ HCPCS (ALT 636 FOR OP/ED): Performed by: LICENSED PRACTICAL NURSE

## 2025-06-02 PROCEDURE — 3700000001 HC GENERAL ANESTHESIA TIME - INITIAL BASE CHARGE

## 2025-06-02 PROCEDURE — 45385 COLONOSCOPY W/LESION REMOVAL: CPT | Performed by: SURGERY

## 2025-06-02 PROCEDURE — 7100000010 HC PHASE TWO TIME - EACH INCREMENTAL 1 MINUTE

## 2025-06-02 RX ORDER — LIDOCAINE HYDROCHLORIDE 20 MG/ML
INJECTION, SOLUTION INFILTRATION; PERINEURAL AS NEEDED
Status: DISCONTINUED | OUTPATIENT
Start: 2025-06-02 | End: 2025-06-02

## 2025-06-02 RX ORDER — DESONIDE 0.5 MG/G
CREAM TOPICAL AS NEEDED
COMMUNITY
Start: 2025-02-04

## 2025-06-02 RX ORDER — PROPOFOL 10 MG/ML
INJECTION, EMULSION INTRAVENOUS AS NEEDED
Status: DISCONTINUED | OUTPATIENT
Start: 2025-06-02 | End: 2025-06-02

## 2025-06-02 RX ADMIN — PROPOFOL 50 MG: 10 INJECTION, EMULSION INTRAVENOUS at 08:45

## 2025-06-02 RX ADMIN — LIDOCAINE HYDROCHLORIDE 30 MG: 20 INJECTION, SOLUTION INFILTRATION; PERINEURAL at 08:45

## 2025-06-02 RX ADMIN — PROPOFOL 50 MG: 10 INJECTION, EMULSION INTRAVENOUS at 08:47

## 2025-06-02 RX ADMIN — PROPOFOL 20 MG: 10 INJECTION, EMULSION INTRAVENOUS at 08:56

## 2025-06-02 RX ADMIN — PROPOFOL 50 MG: 10 INJECTION, EMULSION INTRAVENOUS at 08:50

## 2025-06-02 RX ADMIN — PROPOFOL 50 MG: 10 INJECTION, EMULSION INTRAVENOUS at 08:49

## 2025-06-02 RX ADMIN — PROPOFOL 40 MG: 10 INJECTION, EMULSION INTRAVENOUS at 08:54

## 2025-06-02 SDOH — HEALTH STABILITY: MENTAL HEALTH: CURRENT SMOKER: 0

## 2025-06-02 ASSESSMENT — PAIN - FUNCTIONAL ASSESSMENT
PAIN_FUNCTIONAL_ASSESSMENT: 0-10

## 2025-06-02 ASSESSMENT — COLUMBIA-SUICIDE SEVERITY RATING SCALE - C-SSRS
6. HAVE YOU EVER DONE ANYTHING, STARTED TO DO ANYTHING, OR PREPARED TO DO ANYTHING TO END YOUR LIFE?: NO
2. HAVE YOU ACTUALLY HAD ANY THOUGHTS OF KILLING YOURSELF?: NO
1. IN THE PAST MONTH, HAVE YOU WISHED YOU WERE DEAD OR WISHED YOU COULD GO TO SLEEP AND NOT WAKE UP?: NO

## 2025-06-02 ASSESSMENT — PAIN SCALES - GENERAL
PAIN_LEVEL: 0
PAINLEVEL_OUTOF10: 0 - NO PAIN

## 2025-06-02 NOTE — ANESTHESIA POSTPROCEDURE EVALUATION
Patient: Leona Bourne    Procedure Summary       Date: 06/02/25 Room / Location: [facility]    Anesthesia Start: 0842 Anesthesia Stop: 0904    Procedure: COLONOSCOPY Diagnosis: Screening for colon cancer    Scheduled Providers: Jonnie Green MD Responsible Provider: PHILL Yu    Anesthesia Type: MAC ASA Status: 3            Anesthesia Type: MAC    Vitals Value Taken Time   /70 06/02/25 09:13   Temp 36.3 °C (97.3 °F) 06/02/25 09:03   Pulse 86 06/02/25 09:13   Resp 18 06/02/25 09:13   SpO2 97 % 06/02/25 09:13       Anesthesia Post Evaluation    Patient location during evaluation: PACU  Patient participation: complete - patient participated  Level of consciousness: awake and alert  Pain score: 0  Pain management: adequate  Airway patency: patent  Cardiovascular status: acceptable and stable  Respiratory status: acceptable and room air  Hydration status: acceptable  Postoperative Nausea and Vomiting: none        There were no known notable events for this encounter.

## 2025-06-02 NOTE — DISCHARGE INSTRUCTIONS
Patient Instructions after a Colonoscopy      The anesthetics, sedatives or narcotics which were given to you today will be acting in your body for the next 24 hours, so you might feel a little sleepy or groggy.  This feeling should slowly wear off. Carefully read and follow the instructions.     You received sedation today:  - Do not drive or operate any machinery or power tools of any kind.   - No alcoholic beverages today, not even beer or wine.  - Do not make any important decisions or sign any legal documents.  - No over the counter medications that contain alcohol or that may cause drowsiness.  - Do not make any important decisions or sign any legal documents.  - Make sure you have someone with you for first 24 hours.    While it is common to experience mild to moderate abdominal distention, gas, or belching after your procedure, if any of these symptoms occur following discharge from the GI Lab or within one week of having your procedure, call the [facility] to be advised whether a visit to your nearest Urgent Care or Emergency Department is indicated.  Take this paper with you if you go.     - If you develop an allergic reaction to the medications that were given during your procedure such as difficulty breathing, rash, hives, severe nausea, vomiting or lightheadedness.  - If you experience chest pain, shortness of breath, severe abdominal pain, fevers and chills.  -If you develop signs and symptoms of bleeding such as blood in your spit, if your stools turn black, tarry, or bloody  - If you have not urinated within 8 hours following your procedure.  - If your IV site becomes painful, red, inflamed, or looks infected.    If you received a biopsy/polypectomy/sphincterotomy the following instructions apply below:    __ Do not use Aspirin containing products, non-steroidal medications or anti-coagulants for one week following your procedure. (Examples of these types of medications are: Advil,  Arthrotec, Aleve, Coumadin, Ecotrin, Heparin, Ibuprofen, Indocin, Motrin, Naprosyn, Nuprin, Plavix, Vioxx, and Voltarin, or their generic forms.  This list is not all-inclusive.  Check with your physician or pharmacist before resuming medications.)   __ Eat a soft diet today.  Avoid foods that are poorly digested for the next 24 hours.  These foods would include: nuts, beans, lettuce, red meats, and fried foods. Start with liquids and advance your diet as tolerated, gradually work up to eating solids.   __ Do not have a Barium Study or Enema for one week.    Your physician recommends the additional following instructions:    -You have a contact number available for emergencies. The signs and symptoms of potential delayed complications were discussed with you. You may return to normal activities tomorrow.  -Resume your previous diet.  -Continue your present medications.   -We are waiting for your pathology results.  -Your physician has recommended a repeat colonoscopy (date to be determined after pending pathology results are reviewed) for surveillance based on pathology results.  -The findings and recommendations have been discussed with you.  -The findings and recommendations were discussed with your family.  - Please see Medication Reconciliation Form for new medication/medications prescribed.       If you experience any problems or have any questions following discharge from the GI Lab, please call:        Nurse Signature                                                                        Date___________________                                                                            Patient/Responsible Party Signature                                        Date___________________

## 2025-06-02 NOTE — H&P
"History Of Present Illness  Leona Bourne is a 70 y.o. female presenting for a high risk colonoscopy      Past Medical History  Medical History[1]    Surgical History  Surgical History[2]     Social History  She reports that she has quit smoking. Her smoking use included cigarettes. She has never used smokeless tobacco. She reports that she does not currently use alcohol. She reports that she does not use drugs.    Family History  Family History[3]     Allergies  Patient has no known allergies.    Review of Systems     Physical Exam  Constitutional:       Appearance: Normal appearance.   Cardiovascular:      Heart sounds: Normal heart sounds.   Pulmonary:      Breath sounds: Normal breath sounds and air entry.   Abdominal:      General: Abdomen is flat.      Palpations: Abdomen is soft.      Tenderness: There is no abdominal tenderness.   Neurological:      Mental Status: She is alert.          Last Recorded Vitals  Blood pressure 137/75, pulse 86, temperature 36.9 °C (98.4 °F), temperature source Temporal, resp. rate 17, height 1.727 m (5' 8\"), weight 101 kg (223 lb), SpO2 99%.      Assessment & Plan  Screening for colon cancer        COLONOSCOPY/BIOPSIES.  Risks include, but not limited to pain, infection, bleeding, perforation, missed lesions, aspiration, risk of cardiac, pulmonary, neurologic, locomotor, anesthetic events, incomplete colonoscopy, and other unforeseen complications including death             Jonnie Green MD         [1]   Past Medical History:  Diagnosis Date    Achilles tendinitis, right leg 01/13/2020    Achilles tendinitis of right lower extremity    Body mass index (BMI) 37.0-37.9, adult 10/20/2020    Body mass index (BMI) of 37.0 to 37.9 in adult    Breast cyst 1983    Callus 04/2025    CHF (congestive heart failure)     Disease of thyroid gland     Hypertension 5/1/2016    Morbid (severe) obesity due to excess calories (Multi) 04/14/2021    Class 2 severe obesity due to excess " calories with serious comorbidity and body mass index (BMI) of 37.0 to 37.9 in adult    Morbid (severe) obesity due to excess calories (Multi) 04/14/2021    Class 2 severe obesity with serious comorbidity and body mass index (BMI) of 36.0 to 36.9 in adult    Personal history of diseases of the skin and subcutaneous tissue     History of rosacea    Personal history of other diseases of the circulatory system 04/14/2021    History of congestive heart failure    Personal history of other diseases of the digestive system 03/07/2016    History of esophageal reflux    Personal history of other diseases of the respiratory system 05/28/2015    History of acute sinusitis    Personal history of other malignant neoplasm of skin     Personal history of skin cancer    Personal history of other specified conditions     History of fibrocystic disease of breast    Rectal fistula     Rectal fistula    Right upper quadrant pain 03/07/2016    RUQ abdominal pain    Type 2 diabetes mellitus     Unspecified symptoms and signs involving the genitourinary system 06/09/2020    UTI symptoms   [2]   Past Surgical History:  Procedure Laterality Date    APPENDECTOMY  07/28/2014    Appendectomy    BREAST BIOPSY Left 1985    benign    BREAST CYST ASPIRATION  1983    HYSTERECTOMY  07/28/2014    Hysterectomy    OTHER SURGICAL HISTORY  07/28/2014    Reported Hx Of Breast Surgery For Biopsy    OTHER SURGICAL HISTORY  07/28/2014    Rectal Surgery Repair Of Perirectal Fistula   [3]   Family History  Problem Relation Name Age of Onset    Breast cancer Mother's Sister  60    Heart disease Mother Jesus Avery     Colon polyps Mother Jesus Avery     Diabetes Father Antoine     Colon cancer Father Antoine

## 2025-06-03 ASSESSMENT — PAIN SCALES - GENERAL: PAINLEVEL_OUTOF10: 0 - NO PAIN

## 2025-06-13 LAB
LABORATORY COMMENT REPORT: NORMAL
PATH REPORT.FINAL DX SPEC: NORMAL
PATH REPORT.GROSS SPEC: NORMAL
PATH REPORT.RELEVANT HX SPEC: NORMAL
PATH REPORT.TOTAL CANCER: NORMAL

## 2025-06-16 ENCOUNTER — TELEPHONE (OUTPATIENT)
Dept: SURGERY | Facility: CLINIC | Age: 70
End: 2025-06-16
Payer: MEDICARE

## 2025-06-16 NOTE — TELEPHONE ENCOUNTER
----- Message from Jonnie Green sent at 6/14/2025  6:14 AM EDT -----  Let patient know polyps were removed and nothing to worry about. A my chart reminder will be sent for a repeat colonoscopy in 5 years   ----- Message -----  From: Lab, Background User  Sent: 6/13/2025   6:27 PM EDT  To: Jonnie Green MD

## 2025-07-16 ENCOUNTER — APPOINTMENT (OUTPATIENT)
Dept: PRIMARY CARE | Facility: CLINIC | Age: 70
End: 2025-07-16
Payer: MEDICARE

## 2025-07-16 VITALS
DIASTOLIC BLOOD PRESSURE: 71 MMHG | HEIGHT: 67 IN | SYSTOLIC BLOOD PRESSURE: 128 MMHG | OXYGEN SATURATION: 97 % | WEIGHT: 248.6 LBS | TEMPERATURE: 97.4 F | BODY MASS INDEX: 39.02 KG/M2 | HEART RATE: 72 BPM

## 2025-07-16 DIAGNOSIS — Z12.31 SCREENING MAMMOGRAM FOR BREAST CANCER: ICD-10-CM

## 2025-07-16 DIAGNOSIS — E78.5 BORDERLINE HYPERLIPIDEMIA: ICD-10-CM

## 2025-07-16 DIAGNOSIS — E11.65 TYPE 2 DIABETES MELLITUS WITH HYPERGLYCEMIA, WITHOUT LONG-TERM CURRENT USE OF INSULIN: ICD-10-CM

## 2025-07-16 DIAGNOSIS — Z00.00 MEDICARE ANNUAL WELLNESS VISIT, SUBSEQUENT: Primary | ICD-10-CM

## 2025-07-16 DIAGNOSIS — E03.9 ACQUIRED HYPOTHYROIDISM: ICD-10-CM

## 2025-07-16 DIAGNOSIS — I10 BENIGN ESSENTIAL HYPERTENSION: ICD-10-CM

## 2025-07-16 LAB — POC HEMOGLOBIN A1C: 6.2 % (ref 4.2–6.5)

## 2025-07-16 PROCEDURE — 1159F MED LIST DOCD IN RCRD: CPT | Performed by: PHYSICIAN ASSISTANT

## 2025-07-16 PROCEDURE — 1170F FXNL STATUS ASSESSED: CPT | Performed by: PHYSICIAN ASSISTANT

## 2025-07-16 PROCEDURE — 3044F HG A1C LEVEL LT 7.0%: CPT | Performed by: PHYSICIAN ASSISTANT

## 2025-07-16 PROCEDURE — 3074F SYST BP LT 130 MM HG: CPT | Performed by: PHYSICIAN ASSISTANT

## 2025-07-16 PROCEDURE — 3008F BODY MASS INDEX DOCD: CPT | Performed by: PHYSICIAN ASSISTANT

## 2025-07-16 PROCEDURE — G0439 PPPS, SUBSEQ VISIT: HCPCS | Performed by: PHYSICIAN ASSISTANT

## 2025-07-16 PROCEDURE — 1160F RVW MEDS BY RX/DR IN RCRD: CPT | Performed by: PHYSICIAN ASSISTANT

## 2025-07-16 PROCEDURE — 83036 HEMOGLOBIN GLYCOSYLATED A1C: CPT | Performed by: PHYSICIAN ASSISTANT

## 2025-07-16 PROCEDURE — 3078F DIAST BP <80 MM HG: CPT | Performed by: PHYSICIAN ASSISTANT

## 2025-07-16 ASSESSMENT — ENCOUNTER SYMPTOMS
DEPRESSION: 0
OCCASIONAL FEELINGS OF UNSTEADINESS: 0
LOSS OF SENSATION IN FEET: 0

## 2025-07-16 ASSESSMENT — ACTIVITIES OF DAILY LIVING (ADL)
TAKING_MEDICATION: INDEPENDENT
BATHING: INDEPENDENT
GROCERY_SHOPPING: INDEPENDENT
DRESSING: INDEPENDENT
MANAGING_FINANCES: INDEPENDENT
DOING_HOUSEWORK: INDEPENDENT

## 2025-07-16 NOTE — PROGRESS NOTES
Subjective   HPI   Leona Bourne is a 70 y.o. year old female patient with presenting to clinic with concern for Medicare Visit     Chief Complaint   Patient presents with    Medicare Annual Wellness Visit Subsequent     Discuss Polymyalgia rheumatica           HTN  BP Readings from Last 5 Encounters:   07/16/25 128/71   06/02/25 147/70   04/16/25 117/73   03/14/25 126/77   02/28/25 136/83     Lab Results   Component Value Date    LDLCALC 106 (H) 07/22/2024     The 10-year ASCVD risk score (Thais GARCES, et al., 2019) is: 24.4%    Values used to calculate the score:      Age: 70 years      Clincally relevant sex: Female      Is Non- : No      Diabetic: Yes      Tobacco smoker: No      Systolic Blood Pressure: 128 mmHg      Is BP treated: Yes      HDL Cholesterol: 62.9 mg/dL      Total Cholesterol: 184 mg/dL      CHF  Dr Doyle  -entresto 24/26 bid  Echo 11/2022 decreased LV systolic function, EF 50%, LVH with diastolic dysfunction, mild-moderate mitral regurg, mild tricuspid regurg, LV segmental wall motion abnormalities   Stress test 12/2022 wnl no ischemia  Cardiac cath wnl 12/2022     DM2 with cardiovascular benefits & fatty liver diseaes    has been off entirely  Lab Results   Component Value Date    HGBA1C 6.0 01/22/2025    HGBA1C 5.9 07/22/2024    HGBA1C 6.5 01/22/2024     Lab Results   Component Value Date    LDLCALC 106 (H) 07/22/2024    CREATININE 0.88 02/28/2025     Diabetes Composite Score: 2   Values used to calculate this score:    Points  Metrics       0        Blood Pressure: 147/70       0        Prescribed Statins: No       1        Hemoglobin A1c: 6%       1        Smokes Tobacco: No       0        Prescribed Aspirin: No  Not on  ASA  Not on statins  ARB- Valsartan (entresto)  Annual optho        Wt Readings from Last 5 Encounters:   07/16/25 113 kg (248 lb 9.6 oz)   06/02/25 101 kg (223 lb)   05/19/25 101 kg (223 lb)   04/16/25 101 kg (223 lb)   03/14/25 106 kg (233 lb  12.8 oz)       Hypothyroidism, Hashimoto  -levothyroxine  Lab Results   Component Value Date    TSH 6.31 (H) 02/28/2025   Redraw ordered    Gout  Dr Hassan    Chronic knee pain   Dr Puente     DEXA BONE DENSITY  === 02/27/25 ===  Since she is 10% risk with adjusted FRAX, she is medium risk and since she is on prednisone, will start fosamax   DEXA:  According to World Health Organization criteria,  classification is normal.  -on Fosamax per Dr Kinsey    Polymyalgia Rheumatica  Dr Kinsey  -prednisone helping fatigue and shoulder & hip pain    Patient Care Team:  Linda Vitale PA-C as PCP - General (Family Medicine)     Specialists    Past Medical, Surgical, and Family History reviewed and updated in chart.    Reviewed all medications by prescribing practitioner or clinical pharmacist (such as prescriptions, OTCs, herbal therapies and supplements) and documented in the medical record.     Preventative Health   Health Maintenance Due   Topic Date Due    Diabetic Eye Exam  Never done    Hepatitis A Vaccine (1 of 2 - Risk 2-dose series) Never done    Hepatitis B Vaccine (1 of 3 - Risk 3-dose series) Never done    Pneumococcal Vaccination (2 of 2 - PPSV23, PCV20, or PCV21) 05/08/2020    DTaP/Tdap/Td Vaccines (2 - Td or Tdap) 07/30/2022    Hemoglobin A1C  04/22/2025    Breast Cancer Screening  05/02/2025    Lipid (cholesterol) test  07/22/2025    Urine Protein Check  07/26/2025            Topic Date Due    Hepatitis A Vaccines (1 of 2 - Risk 2-dose series) Never done    Hepatitis B Vaccines (1 of 3 - Risk 3-dose series) Never done    DTaP/Tdap/Td Vaccines (2 - Td or Tdap) 07/30/2022        Immunizations Reviewed  Immunization History   Administered Date(s) Administered    Flu vaccine, trivalent, preservative free, HIGH-DOSE, age 65y+ (Fluzone) 03/13/2020, 10/20/2020, 10/27/2021, 12/06/2022    Flu vaccine, trivalent, preservative free, age 6 months and greater (Fluarix/Fluzone/Flulaval) 10/22/2008    Moderna SARS-CoV-2  "Vaccination 04/21/2021, 05/19/2021, 01/05/2022    PPD Test 07/29/2014    Pneumococcal Conjugate PCV 7 10/22/1998    Pneumococcal conjugate vaccine, 13-valent (PREVNAR 13) 03/13/2020    Td (adult), unspecified 10/11/1988    Tdap vaccine, age 7 year and older (BOOSTRIX, ADACEL) 07/30/2012        Problem List Reviewed  Problem List[1]    Medical History[1]   Surgical History[1]   Family History[1]   Social History     Tobacco Use    Smoking status: Former     Types: Cigarettes    Smokeless tobacco: Never   Substance Use Topics    Alcohol use: Not Currently        Medications Reviewed  Medications Ordered Prior to Encounter[2]     Review of Systems  Constitutional: Denies fever  HEENT: Denies ST, earache  CVS: Denies Chest pain  Pulmonary: Denies wheezing, SOB  GI: Denies N/V  : Denies dysuria  Musculoskeletal:  Denies myalgia  Neuro: Denies focal weakness or numbness.  Skin: Denies Rashes.  *Review of Systems is negative unless otherwise mentioned in HPI or ROS above.      @OBJECTIVEBEGIN  /71   Pulse 72   Temp 36.3 °C (97.4 °F)   Ht 1.696 m (5' 6.77\")   Wt 113 kg (248 lb 9.6 oz)   SpO2 97%   BMI 39.20 kg/m²  reviewed Body mass index is 39.2 kg/m².     Physical Exam  Constitutional: NAD. Afebrile. Resting comfortably.  ENT: Nasal mucosa and oropharynx: moist oral mucosa. Posterior oropharynx nonerythematous. No posterior pharyngeal streaking.  Eyes: PERRLA. EOM intact. No vertical or circular nystagmus.  Lymph: No anterior cervical chain or submandibular lymphadenopathy. No sentinel lymph nodes.  Cardiac: Regular rate & rhythm. No murmur, gallops, or rubs.  Pulmonary: Lungs clear to auscultation bilaterally with good aeration. No wheezes, rhonchi, or rales. Normal WOB.  GI: Soft, Nontender, nondistended. No guarding. Normal BS x4.  : No suprapubic tenderness. No CVA tenderness to percussion.   Musculoskeletal: No peripheral edema.   Skin: No evidence of trauma. No rashes  Neuro: No focal neuro deficits. " Normal gait without assistive devices.  Psych: Intact judgement and insight.    MDM        .Assessment/Plan   Problem List Items Addressed This Visit           ICD-10-CM    Hypothyroidism - Primary E03.9    Relevant Orders    TSH    Type 2 diabetes mellitus with hyperglycemia, without long-term current use of insulin E11.65    Relevant Orders    POCT glycosylated hemoglobin (Hb A1C) manually resulted (Completed)    Albumin-Creatinine Ratio, Urine Random            [1]   Patient Active Problem List  Diagnosis    Benign essential hypertension    Chronic diastolic congestive heart failure    Hashimoto's thyroiditis    Hepatic steatosis    Hypothyroidism    Type 2 diabetes mellitus with hyperglycemia, without long-term current use of insulin    Vitamin D deficiency   [1]   Past Medical History:  Diagnosis Date    Achilles tendinitis, right leg 01/13/2020    Achilles tendinitis of right lower extremity    Body mass index (BMI) 37.0-37.9, adult 10/20/2020    Body mass index (BMI) of 37.0 to 37.9 in adult    Breast cyst 1983    Callus 04/2025    CHF (congestive heart failure)     Disease of thyroid gland     Hypertension 5/1/2016    Morbid (severe) obesity due to excess calories (Multi) 04/14/2021    Class 2 severe obesity due to excess calories with serious comorbidity and body mass index (BMI) of 37.0 to 37.9 in adult    Morbid (severe) obesity due to excess calories (Multi) 04/14/2021    Class 2 severe obesity with serious comorbidity and body mass index (BMI) of 36.0 to 36.9 in adult    Personal history of diseases of the skin and subcutaneous tissue     History of rosacea    Personal history of other diseases of the circulatory system 04/14/2021    History of congestive heart failure    Personal history of other diseases of the digestive system 03/07/2016    History of esophageal reflux    Personal history of other diseases of the respiratory system 05/28/2015    History of acute sinusitis    Personal history of other  malignant neoplasm of skin     Personal history of skin cancer    Personal history of other specified conditions     History of fibrocystic disease of breast    Rectal fistula     Rectal fistula    Right upper quadrant pain 03/07/2016    RUQ abdominal pain    Type 2 diabetes mellitus     Unspecified symptoms and signs involving the genitourinary system 06/09/2020    UTI symptoms   [1]   Past Surgical History:  Procedure Laterality Date    APPENDECTOMY  07/28/2014    Appendectomy    BREAST BIOPSY Left 1985    benign    BREAST CYST ASPIRATION  1983    HYSTERECTOMY  07/28/2014    Hysterectomy    OTHER SURGICAL HISTORY  07/28/2014    Reported Hx Of Breast Surgery For Biopsy    OTHER SURGICAL HISTORY  07/28/2014    Rectal Surgery Repair Of Perirectal Fistula   [1]   Family History  Problem Relation Name Age of Onset    Breast cancer Mother's Sister  60    Heart disease Mother Jesus Avery     Colon polyps Mother Jesus Avery     Diabetes Father Antoine     Colon cancer Father Antoine    [2]   Current Outpatient Medications on File Prior to Visit   Medication Sig Dispense Refill    alendronate (Fosamax) 70 mg tablet Take 1 tablet (70 mg) by mouth every 7 days. Take on an empty stomach. Do not lie down or eat for 1/2 hour after taking. 12 tablet 0    calcium citrate (Calcitrate) 950 mg (200 mg elemental) tablet Take 1 tablet by mouth once daily.      cholecalciferol (Vitamin D3) 25 mcg (1,000 units) tablet Take 1 tablet (25 mcg) by mouth once daily.      desonide (DesOwen) 0.05 % cream Apply topically if needed.      levothyroxine (Synthroid, Levoxyl) 137 mcg tablet Take 1 tablet (137 mcg) by mouth once daily. 90 tablet 1    predniSONE (Deltasone) 5 mg tablet Take 2 tablets (10 mg) by mouth once daily for 30 days, THEN 1.5 tablets (7.5 mg) once daily for 30 days, THEN 1 tablet (5 mg) once daily. 135 tablet 0    sacubitriL-valsartan (Entresto) 24-26 mg tablet Take by mouth twice a day.       No current facility-administered  medications on file prior to visit.

## 2025-07-18 LAB
ALBUMIN/CREAT UR: 16 MG/G CREAT
CREAT UR-MCNC: 109 MG/DL (ref 20–275)
MICROALBUMIN UR-MCNC: 1.7 MG/DL

## 2025-07-22 DIAGNOSIS — E78.5 BORDERLINE HYPERLIPIDEMIA: ICD-10-CM

## 2025-08-02 LAB
ALBUMIN SERPL-MCNC: 4.3 G/DL (ref 3.6–5.1)
ALP SERPL-CCNC: 67 U/L (ref 37–153)
ALT SERPL-CCNC: 11 U/L (ref 6–29)
ANION GAP SERPL CALCULATED.4IONS-SCNC: 12 MMOL/L (CALC) (ref 7–17)
AST SERPL-CCNC: 14 U/L (ref 10–35)
BASOPHILS # BLD AUTO: 79 CELLS/UL (ref 0–200)
BASOPHILS NFR BLD AUTO: 1.3 %
BILIRUB SERPL-MCNC: 0.9 MG/DL (ref 0.2–1.2)
BUN SERPL-MCNC: 18 MG/DL (ref 7–25)
CALCIUM SERPL-MCNC: 9.5 MG/DL (ref 8.6–10.4)
CHLORIDE SERPL-SCNC: 101 MMOL/L (ref 98–110)
CHOLEST SERPL-MCNC: 188 MG/DL
CHOLEST/HDLC SERPL: 2.4 (CALC)
CO2 SERPL-SCNC: 26 MMOL/L (ref 20–32)
CREAT SERPL-MCNC: 0.85 MG/DL (ref 0.6–1)
CRP SERPL-MCNC: NORMAL MG/L
EGFRCR SERPLBLD CKD-EPI 2021: 73 ML/MIN/1.73M2
EOSINOPHIL # BLD AUTO: 122 CELLS/UL (ref 15–500)
EOSINOPHIL NFR BLD AUTO: 2 %
ERYTHROCYTE [DISTWIDTH] IN BLOOD BY AUTOMATED COUNT: 13 % (ref 11–15)
ERYTHROCYTE [SEDIMENTATION RATE] IN BLOOD BY WESTERGREN METHOD: 36 MM/H
GLUCOSE SERPL-MCNC: 113 MG/DL (ref 65–99)
HCT VFR BLD AUTO: 44.7 % (ref 35–45)
HDLC SERPL-MCNC: 79 MG/DL
HGB BLD-MCNC: 14.6 G/DL (ref 11.7–15.5)
LDLC SERPL CALC-MCNC: 90 MG/DL (CALC)
LYMPHOCYTES # BLD AUTO: 1678 CELLS/UL (ref 850–3900)
LYMPHOCYTES NFR BLD AUTO: 27.5 %
MCH RBC QN AUTO: 30.9 PG (ref 27–33)
MCHC RBC AUTO-ENTMCNC: 32.7 G/DL (ref 32–36)
MCV RBC AUTO: 94.7 FL (ref 80–100)
MONOCYTES # BLD AUTO: 439 CELLS/UL (ref 200–950)
MONOCYTES NFR BLD AUTO: 7.2 %
NEUTROPHILS # BLD AUTO: 3782 CELLS/UL (ref 1500–7800)
NEUTROPHILS NFR BLD AUTO: 62 %
NONHDLC SERPL-MCNC: 109 MG/DL (CALC)
PLATELET # BLD AUTO: 347 THOUSAND/UL (ref 140–400)
PMV BLD REES-ECKER: 10.2 FL (ref 7.5–12.5)
POTASSIUM SERPL-SCNC: 4.7 MMOL/L (ref 3.5–5.3)
PROT SERPL-MCNC: 7 G/DL (ref 6.1–8.1)
RBC # BLD AUTO: 4.72 MILLION/UL (ref 3.8–5.1)
SODIUM SERPL-SCNC: 139 MMOL/L (ref 135–146)
TRIGL SERPL-MCNC: 97 MG/DL
TSH SERPL-ACNC: 1.7 MIU/L (ref 0.4–4.5)
URATE SERPL-MCNC: 6.4 MG/DL (ref 2.5–7)
WBC # BLD AUTO: 6.1 THOUSAND/UL (ref 3.8–10.8)

## 2025-08-04 LAB
ALBUMIN SERPL-MCNC: 4.3 G/DL (ref 3.6–5.1)
ALP SERPL-CCNC: 67 U/L (ref 37–153)
ALT SERPL-CCNC: 11 U/L (ref 6–29)
ANION GAP SERPL CALCULATED.4IONS-SCNC: 12 MMOL/L (CALC) (ref 7–17)
AST SERPL-CCNC: 14 U/L (ref 10–35)
BASOPHILS # BLD AUTO: 79 CELLS/UL (ref 0–200)
BASOPHILS NFR BLD AUTO: 1.3 %
BILIRUB SERPL-MCNC: 0.9 MG/DL (ref 0.2–1.2)
BUN SERPL-MCNC: 18 MG/DL (ref 7–25)
CALCIUM SERPL-MCNC: 9.5 MG/DL (ref 8.6–10.4)
CHLORIDE SERPL-SCNC: 101 MMOL/L (ref 98–110)
CO2 SERPL-SCNC: 26 MMOL/L (ref 20–32)
CREAT SERPL-MCNC: 0.85 MG/DL (ref 0.6–1)
CRP SERPL-MCNC: 11.1 MG/L
EGFRCR SERPLBLD CKD-EPI 2021: 73 ML/MIN/1.73M2
EOSINOPHIL # BLD AUTO: 122 CELLS/UL (ref 15–500)
EOSINOPHIL NFR BLD AUTO: 2 %
ERYTHROCYTE [DISTWIDTH] IN BLOOD BY AUTOMATED COUNT: 13 % (ref 11–15)
ERYTHROCYTE [SEDIMENTATION RATE] IN BLOOD BY WESTERGREN METHOD: 36 MM/H
GLUCOSE SERPL-MCNC: 113 MG/DL (ref 65–99)
HCT VFR BLD AUTO: 44.7 % (ref 35–45)
HGB BLD-MCNC: 14.6 G/DL (ref 11.7–15.5)
LYMPHOCYTES # BLD AUTO: 1678 CELLS/UL (ref 850–3900)
LYMPHOCYTES NFR BLD AUTO: 27.5 %
MCH RBC QN AUTO: 30.9 PG (ref 27–33)
MCHC RBC AUTO-ENTMCNC: 32.7 G/DL (ref 32–36)
MCV RBC AUTO: 94.7 FL (ref 80–100)
MONOCYTES # BLD AUTO: 439 CELLS/UL (ref 200–950)
MONOCYTES NFR BLD AUTO: 7.2 %
NEUTROPHILS # BLD AUTO: 3782 CELLS/UL (ref 1500–7800)
NEUTROPHILS NFR BLD AUTO: 62 %
PLATELET # BLD AUTO: 347 THOUSAND/UL (ref 140–400)
PMV BLD REES-ECKER: 10.2 FL (ref 7.5–12.5)
POTASSIUM SERPL-SCNC: 4.7 MMOL/L (ref 3.5–5.3)
PROT SERPL-MCNC: 7 G/DL (ref 6.1–8.1)
RBC # BLD AUTO: 4.72 MILLION/UL (ref 3.8–5.1)
SODIUM SERPL-SCNC: 139 MMOL/L (ref 135–146)
WBC # BLD AUTO: 6.1 THOUSAND/UL (ref 3.8–10.8)

## 2025-08-08 ENCOUNTER — APPOINTMENT (OUTPATIENT)
Facility: CLINIC | Age: 70
End: 2025-08-08
Payer: MEDICARE

## 2025-08-08 DIAGNOSIS — Z79.899 HIGH RISK MEDICATION USE: ICD-10-CM

## 2025-08-08 DIAGNOSIS — M35.3 POLYMYALGIA RHEUMATICA (MULTI): Primary | ICD-10-CM

## 2025-08-08 PROCEDURE — 3044F HG A1C LEVEL LT 7.0%: CPT | Performed by: INTERNAL MEDICINE

## 2025-08-08 PROCEDURE — 99215 OFFICE O/P EST HI 40 MIN: CPT | Performed by: INTERNAL MEDICINE

## 2025-08-08 RX ORDER — PREDNISONE 1 MG/1
TABLET ORAL
Qty: 90 TABLET | Refills: 0 | Status: SHIPPED | OUTPATIENT
Start: 2025-08-08

## 2025-08-08 RX ORDER — PREDNISONE 5 MG/1
TABLET ORAL
Qty: 90 TABLET | Refills: 0 | Status: SHIPPED | OUTPATIENT
Start: 2025-08-08

## 2025-08-08 NOTE — PROGRESS NOTES
Subjective   Patient ID: [id number]  Leona Bourne is a 70 y.o. female who presents for follow up    Virtual or Telephone Consent    An interactive audio and video telecommunication system which permits real time communications between the patient (at the originating site) and provider (at the distant site) was utilized to provide this telehealth service.   Verbal consent was requested and obtained from Leona Bourne on this date, 08/08/25 for a telehealth visit and the patient's location was confirmed at the time of the visit.      HPI  PMH/PSH: hypothyroid and is on synthroid, HTN, LYNDA  Social: Nonsmoker, no alcohol intake, no drug use. Does part time at [employer], she is an . Has 3 children.   FHx: Brother has gout    First visit 2/28/2025:  She first had L shoulder pain 2nd week of January 2025, she was having pain when she would try to move positions at night, or move her shoulder, sometimes even she she is laying down. Waking her up at night.   January 14th she woke up and her R shoulder was painful, L shoulder was more painful, moving through the night would exacerbate the pain. Her R hip and buttock, worse with prolonged sitting, when she is getting out of bed, L big toe started hurting and was swollen and difficulty weight bearing, changed color and it felt like it was gout. L big toe pain significantly imrpoved, but still hurts if she walks on it for too long. R posterior ankle was hurting and almost resolved.   Within a few days L hip started hurting, but not as bad as right side.   Shoulder pain goes up her neck too.      Overhead activities are painful and difficult. Prior to all of this overhead activities were not difficult to do.   Moving and walking is ok after a couple of hours in the AM.      B/l shoulder pain progressively got worse within a week and has been pretty much the same since. R hip has been pretty much the same for the last few weeks.      AM is significantly  "difficult, can hardly reach to turn on her lamp.   By 6-7 PM all the symptoms are heightened again.      She leads an active lifestyle, goes on trails and since January things are significantly different.      A couple of years ago she dealt with \"inflammation in both her knees\" and saw Dr. Puente for OA and got CSI 2022 and hasn't had any problems since. Effect of injections are still lasting.      She reports that her hypothyroidism is controlled.    2/28 labs with APR   3/3 I started her on prednisone 20mg  She feels 100% improved on prednisone, tolerating it well     Current meds  Prednisone 10mg daily (started 20mg 3/3 tapering by 2.5mgq2 weeks)   Fosamax weekly started 3/14/2025    5/7: Marked calendar wrong and did 12.5mg for an extra week, has been on 10mg for two days now, feeling well, no problems with the taper.   No longer difficult with overhead activities, no shoulder pain or hip pain, can cross her legs now. Fatigue has improved  We continued to taper down prednisone by 2.5mgq month, when she got down to 5mg July 7th she started having worsening shoulder girdle pain but she put it off and she thought it was related to activity but then her left shoulder started becoming problematic again. Feels it more in night and AM and then subsides as the day goes by, nothing like when it first started.     Denies fever, chills. Denies dry eyes, blurry vision, redness or pain or photophobia. Denies dry mouth, dental loss, loss of taste, + nasal sore from dryness, no difficulty swallowing, recurrent sinus infections. Denies chest pain. Denies shortness of breath, cough, asthma, or recurrent respiratory infections. Denies nausea, vomiting, heartburn, abdominal pain, constipation, diarrhea, melena or hematochezia. No recurrent urinary infections or STDs, no genital or anal ulcers. Denies photosensitivity, rash or lesions, Raynaud's phenomenon. Denies any numbness or tingling. Denies history of clots (arterial or " venous), or abortions/miscarriages/pregnancy complications     Patient Active Problem List   Diagnosis   • Benign essential hypertension   • Chronic diastolic congestive heart failure   • Hashimoto's thyroiditis   • Hepatic steatosis   • Hypothyroidism   • Type 2 diabetes mellitus with hyperglycemia, without long-term current use of insulin   • Vitamin D deficiency       Past Medical History:   Diagnosis Date   • Achilles tendinitis, right leg 01/13/2020    Achilles tendinitis of right lower extremity   • Body mass index (BMI) 37.0-37.9, adult 10/20/2020    Body mass index (BMI) of 37.0 to 37.9 in adult   • Breast cyst 1983   • Callus 04/2025   • CHF (congestive heart failure)    • Disease of thyroid gland    • Hypertension 5/1/2016   • Morbid (severe) obesity due to excess calories (Multi) 04/14/2021    Class 2 severe obesity due to excess calories with serious comorbidity and body mass index (BMI) of 37.0 to 37.9 in adult   • Morbid (severe) obesity due to excess calories (Multi) 04/14/2021    Class 2 severe obesity with serious comorbidity and body mass index (BMI) of 36.0 to 36.9 in adult   • Personal history of diseases of the skin and subcutaneous tissue     History of rosacea   • Personal history of other diseases of the circulatory system 04/14/2021    History of congestive heart failure   • Personal history of other diseases of the digestive system 03/07/2016    History of esophageal reflux   • Personal history of other diseases of the respiratory system 05/28/2015    History of acute sinusitis   • Personal history of other malignant neoplasm of skin     Personal history of skin cancer   • Personal history of other specified conditions     History of fibrocystic disease of breast   • Rectal fistula     Rectal fistula   • Right upper quadrant pain 03/07/2016    RUQ abdominal pain   • Type 2 diabetes mellitus    • Unspecified symptoms and signs involving the genitourinary system 06/09/2020    UTI symptoms        Past Surgical History:   Procedure Laterality Date   • APPENDECTOMY  2014    Appendectomy   • BREAST BIOPSY Left     benign   • BREAST CYST ASPIRATION     • HYSTERECTOMY  2014    Hysterectomy   • OTHER SURGICAL HISTORY  2014    Reported Hx Of Breast Surgery For Biopsy   • OTHER SURGICAL HISTORY  2014    Rectal Surgery Repair Of Perirectal Fistula       Social History     Socioeconomic History   • Marital status:      Spouse name: Not on file   • Number of children: Not on file   • Years of education: Not on file   • Highest education level: Not on file   Occupational History   • Not on file   Tobacco Use   • Smoking status: Former     Current packs/day: 0.00     Average packs/day: 1 pack/day for 30.0 years (30.0 ttl pk-yrs)     Types: Cigarettes     Start date: 1968     Quit date: 1998     Years since quittin.3   • Smokeless tobacco: Never   Vaping Use   • Vaping status: Never Used   Substance and Sexual Activity   • Alcohol use: Not Currently   • Drug use: Never   • Sexual activity: Not Currently     Birth control/protection: None   Other Topics Concern   • Not on file   Social History Narrative   • Not on file     Social Drivers of Health     Financial Resource Strain: Not on file   Food Insecurity: Not on file   Transportation Needs: Not on file   Physical Activity: Not on file   Stress: Not on file   Social Connections: Not on file   Intimate Partner Violence: Not on file   Housing Stability: Not on file       No Known Allergies      Current Outpatient Medications:   •  alendronate (Fosamax) 70 mg tablet, Take 1 tablet (70 mg) by mouth every 7 days. Take on an empty stomach. Do not lie down or eat for 1/2 hour after taking., Disp: 12 tablet, Rfl: 0  •  calcium citrate (Calcitrate) 950 mg (200 mg elemental) tablet, Take 1 tablet by mouth once daily., Disp: , Rfl:   •  cholecalciferol (Vitamin D3) 25 mcg (1,000 units) tablet, Take 1 tablet (25 mcg) by  mouth once daily., Disp: , Rfl:   •  desonide (DesOwen) 0.05 % cream, Apply topically if needed., Disp: , Rfl:   •  levothyroxine (Synthroid, Levoxyl) 137 mcg tablet, Take 1 tablet (137 mcg) by mouth once daily., Disp: 90 tablet, Rfl: 1  •  sacubitriL-valsartan (Entresto) 24-26 mg tablet, Take by mouth twice a day., Disp: , Rfl:     Objective     There were no vitals taken for this visit.      Physical Exam  Virtual visit, FROM on exam   Feels tight in her R shoulder with hands above head      Lab Results   Component Value Date    WBC 6.1 08/01/2025    HGB 14.6 08/01/2025    HCT 44.7 08/01/2025    MCV 94.7 08/01/2025     08/01/2025       Chemistry    Lab Results   Component Value Date/Time     08/01/2025 0911    K 4.7 08/01/2025 0911     08/01/2025 0911    CO2 26 08/01/2025 0911    BUN 18 08/01/2025 0911    CREATININE 0.85 08/01/2025 0911    Lab Results   Component Value Date/Time    CALCIUM 9.5 08/01/2025 0911    ALKPHOS 67 08/01/2025 0911    AST 14 08/01/2025 0911    ALT 11 08/01/2025 0911    BILITOT 0.9 08/01/2025 0911          Lab Results   Component Value Date    CRP 11.1 (H) 08/01/2025    ISABELLA POSITIVE (A) 02/28/2025    JO1 <1.0 NEG 02/28/2025    SPEP Normal. 02/28/2025    PTH 46 02/28/2025    CALCIUM 9.5 08/01/2025    PHOS 3.5 02/28/2025    FERRITIN 205 (H) 03/18/2022     ALKALINE PHOSPHATASE   Date Value Ref Range Status   08/01/2025 67 37 - 153 U/L Final     AST   Date Value Ref Range Status   08/01/2025 14 10 - 35 U/L Final     UREA NITROGEN (BUN)   Date Value Ref Range Status   08/01/2025 18 7 - 25 mg/dL Final     SODIUM   Date Value Ref Range Status   08/01/2025 139 135 - 146 mmol/L Final     POTASSIUM   Date Value Ref Range Status   08/01/2025 4.7 3.5 - 5.3 mmol/L Final     CARBON DIOXIDE   Date Value Ref Range Status   08/01/2025 26 20 - 32 mmol/L Final     ALT   Date Value Ref Range Status   08/01/2025 11 6 - 29 U/L Final     VITAMIN D,25-OH,TOTAL,IA   Date Value Ref Range Status    02/28/2025 32 30 - 100 ng/mL Final     Comment:     Vitamin D Status         25-OH Vitamin D:     Deficiency:                    <20 ng/mL  Insufficiency:             20 - 29 ng/mL  Optimal:                 > or = 30 ng/mL     For 25-OH Vitamin D testing on patients on   D2-supplementation and patients for whom quantitation   of D2 and D3 fractions is required, the QuestAssureD(TM)  25-OH VIT D, (D2,D3), LC/MS/MS is recommended: order   code 27798 (patients >2yrs).     See Note 1     Note 1     For additional information, please refer to   http://education.[employer]/faq/QCO564   (This link is being provided for informational/  educational purposes only.)         Colonoscopy Screening; High Risk Patient; fam hx colon ca, father, last colonosc 5 yr  Addendum: Impression   Polyp in the descending colon was removed with cold snare     Findings   One sessile and benign-appearing polyp measuring smaller than 5 mm in the  descending colon; performed cold snare with complete en bloc removal and  retrieved specimen         Recommendation   Await pathology results    Repeat colonoscopy in 5 years, due: 6/1/2030      Indication   Screening for colon cancer     Post-Op Diagnosis   None     Staff   Staff Role    Jonnie Green MD Proceduralist      Medications   See Anesthesia Record.      Preprocedure   A history and physical has been performed, and patient medication  allergies have been reviewed. The patient's tolerance of previous  anesthesia has been reviewed. The risks and benefits of the procedure and  the sedation options and risks were discussed with the patient. All  questions were answered and informed consent obtained.     Details of the Procedure   The patient underwent monitored anesthesia care, which was administered by  an anesthesia professional. The patient's blood pressure, ECG, ETCO2,  heart rate, level of consciousness, oxygen and respirations were monitored  throughout the procedure. A digital  rectal exam was performed. The scope  was introduced through the anus and advanced to the cecum. Retroflexion  was performed in the rectum. The quality of bowel preparation was  evaluated using the [place] Bowel Preparation Scale with scores of: right  colon = 3, transverse colon = 3, left colon = 3. The total BBPS score was  9. Bowel prep was adequate. The patient experienced no blood loss. The  procedure was not difficult. The patient tolerated the procedure well.  There were no apparent adverse events.      Events   Procedure Events    Event Event Time    ENDO SCOPE IN TIME 6/2/2025  8:46 AM    ENDO CECUM REACHED 6/2/2025  8:49 AM    ENDO SCOPE OUT TIME 6/2/2025  8:58 AM      Specimens   ID Type Source Tests Collected by Time    1 : descending colon polyp cold snare Tissue COLON - DESCENDING POLYP  SURGICAL PATHOLOGY EXAM Jonnie Green MD 6/2/2025 0856      Procedure Location   [facility]   [address] [facility] [address]   [phone]     Referring Provider   Linda Vitale PA-C     Procedure Provider   Jonnie Green MD     Impression   Polyp in the descending colon was removed with cold snare     Findings   One sessile and benign-appearing polyp measuring smaller than 5 mm in the  descending colon; performed cold snare with complete en bloc removal and  retrieved specimen         Recommendation   Await pathology results    Repeat colonoscopy in 5 years, due: 6/1/2030      Indication   Screening for colon cancer     Post-Op Diagnosis   None     Staff   Staff Role    Jonnie Green MD Proceduralist      Medications   See Anesthesia Record.      Preprocedure   A history and physical has been performed, and patient medication  allergies have been reviewed. The patient's tolerance of previous  anesthesia has been reviewed. The risks and benefits of the procedure and  the sedation options and risks were discussed with the patient. All  questions were answered and  informed consent obtained.     Details of the Procedure   The patient underwent monitored anesthesia care, which was administered by  an anesthesia professional. The patient's blood pressure, ECG, ETCO2,  heart rate, level of consciousness, oxygen and respirations were monitored  throughout the procedure. A digital rectal exam was performed. The scope  was introduced through the anus and advanced to the cecum. Retroflexion  was performed in the rectum. The quality of bowel preparation was  evaluated using the [place] Bowel Preparation Scale with scores of: right  colon = 3, transverse colon = 3, left colon = 3. The total BBPS score was  9. Bowel prep was adequate. The patient experienced no blood loss. The  procedure was not difficult. The patient tolerated the procedure well.  There were no apparent adverse events.      Events   Procedure Events    Event Event Time    ENDO SCOPE IN TIME 6/2/2025  8:46 AM    ENDO CECUM REACHED 6/2/2025  8:49 AM    ENDO SCOPE OUT TIME 6/2/2025  8:58 AM      Specimens   ID Type Source Tests Collected by Time    1 : descending colon polyp cold snare Tissue COLON - DESCENDING POLYP  SURGICAL PATHOLOGY EXAM Jonnie Green MD 6/2/2025 0856      Procedure Location   [facility]   [address] [address]   [phone]     Referring Provider   Linda Vitale PA-C     Procedure Provider   Jonnie Green MD  Narrative: Table formatting from the original result was not included.  Impression  Polyp in the descending colon was removed with cold snare    Findings  One sessile and benign-appearing polyp measuring smaller than 5 mm in the   descending colon; performed cold snare with complete en bloc removal and   retrieved specimen       Recommendation  Await pathology results   No further screening colonoscopies necessary     Indication  Screening for colon cancer    Post-Op Diagnosis  None    Staff  Staff Role   Jonnie Green MD Proceduralist      Medications  See Anesthesia Record.     Preprocedure  A history and physical has been performed, and patient medication   allergies have been reviewed. The patient's tolerance of previous   anesthesia has been reviewed. The risks and benefits of the procedure and   the sedation options and risks were discussed with the patient. All   questions were answered and informed consent obtained.    Details of the Procedure  The patient underwent monitored anesthesia care, which was administered by   an anesthesia professional. The patient's blood pressure, ECG, ETCO2,   heart rate, level of consciousness, oxygen and respirations were monitored   throughout the procedure. A digital rectal exam was performed. The scope   was introduced through the anus and advanced to the cecum. Retroflexion   was performed in the rectum. The quality of bowel preparation was   evaluated using the [place] Bowel Preparation Scale with scores of: right   colon = 3, transverse colon = 3, left colon = 3. The total BBPS score was   9. Bowel prep was adequate. The patient experienced no blood loss. The   procedure was not difficult. The patient tolerated the procedure well.   There were no apparent adverse events.     Events  Procedure Events   Event Event Time   ENDO SCOPE IN TIME 6/2/2025  8:46 AM   ENDO CECUM REACHED 6/2/2025  8:49 AM   ENDO SCOPE OUT TIME 6/2/2025  8:58 AM     Specimens  ID Type Source Tests Collected by Time   1 : descending colon polyp cold snare Tissue COLON - DESCENDING POLYP   SURGICAL PATHOLOGY EXAM Jonnie Green MD 6/2/2025 0856     Procedure Location  [facility]  [address] [facility] [address]  [phone]    Referring Provider  Linda Vitale PA-C    Procedure Provider  Jonnie Green MD    DEXA 2/27/2025  FINDINGS:  SPINE L1-L4  Bone Mineral Density: 1.118  T-Score 0.6  Z-Score 2.8  Classification:  Normal  Bone Mineral Density change vs baseline:  Not reported  Bone  Mineral Density change vs previous: Not reported      LEFT FEMUR -TOTAL  Bone Mineral Density: 1.116  T-Score 1.4   Z-Score  3  Classification:  Normal  Bone Mineral Density change vs baseline: -4.7  Bone Mineral Density change vs previous: -2.2      LEFT FEMUR -NECK  Bone Mineral Density: 0.932  T-Score 0.8  Z-Score 2.6  Classification:  Normal          Assessment/Plan   known hypothyroidism here for new onset b/l shoulder and pelvic girdle pain and stiffness.      Impression: PMR, classical presentation and physical exam  Age>60  Symmetric B/l shoulder discomfort with stiffness, hip and neck pain.   No headaches, jaw or tongue claudication, visual disturbance, scalp tenderness  No high grade fevers     Labs 4/11 reviewed:  Thrombocytosis improved  CRP improving from 1.5mg/dL, currently 0.9  ESR 53-> 33 (normal for age now)     Labs 2/28 reviewed:  SPEP normal   RF, CCP neg  Vitamin D and phos normal   ISABELLA 1:80, ALIA neg  TSH elevated but normal T3  Uric acid 6.1    XR 2/28/2025 reviewed  Shoulder: AC OA   Hips: OA of SI b/l, OA of hips L>R  Feet: OA changes 1-3 TMT b/l, calcaneal enthesophytes b/l R>L     Initially doing well with prednisone taper, improvement in APR, while tapering down to 5mg prednisone had relapse.     - Restart prednisone 7mg and taper by 1mg q month.   - Labs 1 week prior to follow up    DEXA reviewed from 2/27/2025  Adjusted FRAX: 10% for mop, 0.36% for hip   Since she is 10% risk with adjusted FRAX, she is medium risk and since she is on prednisone, will start fosamax   - continue fosamax, counseled on risks/benefits  - continue calcium and vitamin D      She had an acute short lived episode of L 1st MTP pain and swelling and difficulty weightbearing, very suggestive of gout episode.  Uric acid 6.1mg/dL  - This is the first episode so need for ULT unless labs show otherwise or recurrence     RTC in 3 months     Amina Kinsey MD  Division of Rheumatology   [facility]

## 2025-08-19 DIAGNOSIS — E03.9 ACQUIRED HYPOTHYROIDISM: ICD-10-CM

## 2025-08-19 RX ORDER — LEVOTHYROXINE SODIUM 137 UG/1
137 TABLET ORAL DAILY
Qty: 90 TABLET | Refills: 1 | Status: SHIPPED | OUTPATIENT
Start: 2025-08-19

## 2025-08-28 ENCOUNTER — APPOINTMENT (OUTPATIENT)
Dept: RADIOLOGY | Facility: HOSPITAL | Age: 70
End: 2025-08-28
Payer: MEDICARE

## 2025-09-16 ENCOUNTER — APPOINTMENT (OUTPATIENT)
Dept: RADIOLOGY | Facility: HOSPITAL | Age: 70
End: 2025-09-16
Payer: MEDICARE

## 2025-10-29 ENCOUNTER — APPOINTMENT (OUTPATIENT)
Facility: CLINIC | Age: 70
End: 2025-10-29
Payer: MEDICARE

## 2026-01-15 ENCOUNTER — APPOINTMENT (OUTPATIENT)
Dept: PRIMARY CARE | Facility: CLINIC | Age: 71
End: 2026-01-15
Payer: MEDICARE